# Patient Record
Sex: MALE | Race: WHITE | Employment: FULL TIME | URBAN - METROPOLITAN AREA
[De-identification: names, ages, dates, MRNs, and addresses within clinical notes are randomized per-mention and may not be internally consistent; named-entity substitution may affect disease eponyms.]

---

## 2022-09-04 ENCOUNTER — HOSPITAL ENCOUNTER (INPATIENT)
Age: 57
LOS: 2 days | Discharge: HOME | DRG: 200 | End: 2022-09-06
Attending: EMERGENCY MEDICINE | Admitting: SURGERY
Payer: COMMERCIAL

## 2022-09-04 ENCOUNTER — APPOINTMENT (OUTPATIENT)
Dept: GENERAL RADIOLOGY | Age: 57
DRG: 200 | End: 2022-09-04
Payer: COMMERCIAL

## 2022-09-04 ENCOUNTER — APPOINTMENT (OUTPATIENT)
Dept: CT IMAGING | Age: 57
DRG: 200 | End: 2022-09-04
Payer: COMMERCIAL

## 2022-09-04 DIAGNOSIS — S27.0XXA PNEUMOTHORAX, CLOSED, TRAUMATIC, INITIAL ENCOUNTER: Primary | ICD-10-CM
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
## 2022-09-04 DIAGNOSIS — S22.31XA CLOSED FRACTURE OF ONE RIB OF RIGHT SIDE, INITIAL ENCOUNTER: ICD-10-CM

## 2022-09-04 LAB
ABO/RH: NORMAL
ACETAMINOPHEN LEVEL: <5 MCG/ML (ref 10–30)
ALBUMIN SERPL-MCNC: 4.3 G/DL (ref 3.5–5.2)
ALP BLD-CCNC: 41 U/L (ref 40–129)
ALT SERPL-CCNC: 46 U/L (ref 0–40)
ANION GAP SERPL CALCULATED.3IONS-SCNC: 19 MMOL/L (ref 7–16)
ANTIBODY SCREEN: NORMAL
APTT: 25.2 SEC (ref 24.5–35.1)
AST SERPL-CCNC: 69 U/L (ref 0–39)
B.E.: -8.2 MMOL/L (ref -3–3)
BILIRUB SERPL-MCNC: <0.2 MG/DL (ref 0–1.2)
BUN BLDV-MCNC: 11 MG/DL (ref 6–20)
CALCIUM SERPL-MCNC: 8.6 MG/DL (ref 8.6–10.2)
CHLORIDE BLD-SCNC: 101 MMOL/L (ref 98–107)
CO2: 16 MMOL/L (ref 22–29)
COHB: 0.2 % (ref 0–1.5)
COMMENT: ABNORMAL
CREAT SERPL-MCNC: 1 MG/DL (ref 0.7–1.2)
CRITICAL: ABNORMAL
DATE ANALYZED: ABNORMAL
DATE OF COLLECTION: ABNORMAL
ETHANOL: 209 MG/DL (ref 0–0.08)
GFR AFRICAN AMERICAN: >60
GFR NON-AFRICAN AMERICAN: >60 ML/MIN/1.73
GLUCOSE BLD-MCNC: 116 MG/DL (ref 74–99)
HCO3: 17.4 MMOL/L (ref 22–26)
HCT VFR BLD CALC: 35.7 % (ref 37–54)
HEMOGLOBIN: 12.1 G/DL (ref 12.5–16.5)
HHB: 1.2 % (ref 0–5)
INR BLD: 0.9
LAB: ABNORMAL
LACTIC ACID: 2.4 MMOL/L (ref 0.5–2.2)
LACTIC ACID: 4 MMOL/L (ref 0.5–2.2)
Lab: ABNORMAL
MCH RBC QN AUTO: 32.4 PG (ref 26–35)
MCHC RBC AUTO-ENTMCNC: 33.9 % (ref 32–34.5)
MCV RBC AUTO: 95.5 FL (ref 80–99.9)
METHB: 0.3 % (ref 0–1.5)
MODE: ABNORMAL
O2 CONTENT: 18.4 ML/DL
O2 SATURATION: 98.8 % (ref 92–98.5)
O2HB: 98.3 % (ref 94–97)
OPERATOR ID: 1741
PATIENT TEMP: 37 C
PCO2: 36.5 MMHG (ref 35–45)
PDW BLD-RTO: 13 FL (ref 11.5–15)
PH BLOOD GAS: 7.3 (ref 7.35–7.45)
PLATELET # BLD: 211 E9/L (ref 130–450)
PMV BLD AUTO: 9.5 FL (ref 7–12)
PO2: 160.6 MMHG (ref 75–100)
POTASSIUM SERPL-SCNC: 3.76 MMOL/L (ref 3.5–5)
POTASSIUM SERPL-SCNC: 4 MMOL/L (ref 3.5–5)
PROTHROMBIN TIME: 9.8 SEC (ref 9.3–12.4)
RBC # BLD: 3.74 E12/L (ref 3.8–5.8)
SALICYLATE, SERUM: <0.3 MG/DL (ref 0–30)
SODIUM BLD-SCNC: 136 MMOL/L (ref 132–146)
SOURCE, BLOOD GAS: ABNORMAL
THB: 13.1 G/DL (ref 11.5–16.5)
TIME ANALYZED: 707
TOTAL CK: 1103 U/L (ref 20–200)
TOTAL PROTEIN: 7.1 G/DL (ref 6.4–8.3)
TRICYCLIC ANTIDEPRESSANTS SCREEN SERUM: NEGATIVE NG/ML
WBC # BLD: 10.3 E9/L (ref 4.5–11.5)

## 2022-09-04 PROCEDURE — 72170 X-RAY EXAM OF PELVIS: CPT

## 2022-09-04 PROCEDURE — 71045 X-RAY EXAM CHEST 1 VIEW: CPT

## 2022-09-04 PROCEDURE — 70450 CT HEAD/BRAIN W/O DYE: CPT

## 2022-09-04 PROCEDURE — 83605 ASSAY OF LACTIC ACID: CPT

## 2022-09-04 PROCEDURE — 85027 COMPLETE CBC AUTOMATED: CPT

## 2022-09-04 PROCEDURE — 6370000000 HC RX 637 (ALT 250 FOR IP): Performed by: STUDENT IN AN ORGANIZED HEALTH CARE EDUCATION/TRAINING PROGRAM

## 2022-09-04 PROCEDURE — 82077 ASSAY SPEC XCP UR&BREATH IA: CPT

## 2022-09-04 PROCEDURE — 6360000004 HC RX CONTRAST MEDICATION: Performed by: RADIOLOGY

## 2022-09-04 PROCEDURE — 36600 WITHDRAWAL OF ARTERIAL BLOOD: CPT | Performed by: SURGERY

## 2022-09-04 PROCEDURE — 6360000002 HC RX W HCPCS: Performed by: STUDENT IN AN ORGANIZED HEALTH CARE EDUCATION/TRAINING PROGRAM

## 2022-09-04 PROCEDURE — 80179 DRUG ASSAY SALICYLATE: CPT

## 2022-09-04 PROCEDURE — 99223 1ST HOSP IP/OBS HIGH 75: CPT | Performed by: SURGERY

## 2022-09-04 PROCEDURE — 2580000003 HC RX 258: Performed by: STUDENT IN AN ORGANIZED HEALTH CARE EDUCATION/TRAINING PROGRAM

## 2022-09-04 PROCEDURE — 86900 BLOOD TYPING SEROLOGIC ABO: CPT

## 2022-09-04 PROCEDURE — 85610 PROTHROMBIN TIME: CPT

## 2022-09-04 PROCEDURE — 72125 CT NECK SPINE W/O DYE: CPT

## 2022-09-04 PROCEDURE — 80143 DRUG ASSAY ACETAMINOPHEN: CPT

## 2022-09-04 PROCEDURE — 36415 COLL VENOUS BLD VENIPUNCTURE: CPT

## 2022-09-04 PROCEDURE — 74177 CT ABD & PELVIS W/CONTRAST: CPT

## 2022-09-04 PROCEDURE — 82805 BLOOD GASES W/O2 SATURATION: CPT

## 2022-09-04 PROCEDURE — 70486 CT MAXILLOFACIAL W/O DYE: CPT

## 2022-09-04 PROCEDURE — 84132 ASSAY OF SERUM POTASSIUM: CPT

## 2022-09-04 PROCEDURE — 86850 RBC ANTIBODY SCREEN: CPT

## 2022-09-04 PROCEDURE — 6810039001 HC L1 TRAUMA PRIORITY

## 2022-09-04 PROCEDURE — 72131 CT LUMBAR SPINE W/O DYE: CPT

## 2022-09-04 PROCEDURE — 72128 CT CHEST SPINE W/O DYE: CPT

## 2022-09-04 PROCEDURE — 32551 INSERTION OF CHEST TUBE: CPT | Performed by: SURGERY

## 2022-09-04 PROCEDURE — 36410 VNPNXR 3YR/> PHY/QHP DX/THER: CPT | Performed by: SURGERY

## 2022-09-04 PROCEDURE — 82550 ASSAY OF CK (CPK): CPT

## 2022-09-04 PROCEDURE — 71260 CT THORAX DX C+: CPT

## 2022-09-04 PROCEDURE — 80053 COMPREHEN METABOLIC PANEL: CPT

## 2022-09-04 PROCEDURE — 2580000003 HC RX 258: Performed by: RADIOLOGY

## 2022-09-04 PROCEDURE — 86901 BLOOD TYPING SEROLOGIC RH(D): CPT

## 2022-09-04 PROCEDURE — 0W9930Z DRAINAGE OF RIGHT PLEURAL CAVITY WITH DRAINAGE DEVICE, PERCUTANEOUS APPROACH: ICD-10-PCS | Performed by: STUDENT IN AN ORGANIZED HEALTH CARE EDUCATION/TRAINING PROGRAM

## 2022-09-04 PROCEDURE — 2060000000 HC ICU INTERMEDIATE R&B

## 2022-09-04 PROCEDURE — 80307 DRUG TEST PRSMV CHEM ANLYZR: CPT

## 2022-09-04 PROCEDURE — 32551 INSERTION OF CHEST TUBE: CPT

## 2022-09-04 PROCEDURE — 2500000003 HC RX 250 WO HCPCS: Performed by: STUDENT IN AN ORGANIZED HEALTH CARE EDUCATION/TRAINING PROGRAM

## 2022-09-04 PROCEDURE — 85730 THROMBOPLASTIN TIME PARTIAL: CPT

## 2022-09-04 PROCEDURE — 99285 EMERGENCY DEPT VISIT HI MDM: CPT

## 2022-09-04 RX ORDER — SODIUM CHLORIDE 9 MG/ML
25 INJECTION, SOLUTION INTRAVENOUS PRN
Status: DISCONTINUED | OUTPATIENT
Start: 2022-09-04 | End: 2022-09-06 | Stop reason: HOSPADM

## 2022-09-04 RX ORDER — LIDOCAINE HYDROCHLORIDE AND EPINEPHRINE 10; 10 MG/ML; UG/ML
20 INJECTION, SOLUTION INFILTRATION; PERINEURAL ONCE
Status: DISCONTINUED | OUTPATIENT
Start: 2022-09-04 | End: 2022-09-06 | Stop reason: HOSPADM

## 2022-09-04 RX ORDER — POLYETHYLENE GLYCOL 3350 17 G/17G
17 POWDER, FOR SOLUTION ORAL DAILY
Status: DISCONTINUED | OUTPATIENT
Start: 2022-09-04 | End: 2022-09-06 | Stop reason: HOSPADM

## 2022-09-04 RX ORDER — SODIUM CHLORIDE 0.9 % (FLUSH) 0.9 %
5-40 SYRINGE (ML) INJECTION EVERY 12 HOURS SCHEDULED
Status: DISCONTINUED | OUTPATIENT
Start: 2022-09-04 | End: 2022-09-06 | Stop reason: HOSPADM

## 2022-09-04 RX ORDER — ONDANSETRON 2 MG/ML
4 INJECTION INTRAMUSCULAR; INTRAVENOUS EVERY 6 HOURS PRN
Status: DISCONTINUED | OUTPATIENT
Start: 2022-09-04 | End: 2022-09-06 | Stop reason: HOSPADM

## 2022-09-04 RX ORDER — KETAMINE HCL IN NACL, ISO-OSM 100MG/10ML
20 SYRINGE (ML) INJECTION ONCE
Status: COMPLETED | OUTPATIENT
Start: 2022-09-04 | End: 2022-09-04

## 2022-09-04 RX ORDER — ACETAMINOPHEN 500 MG
1000 TABLET ORAL EVERY 8 HOURS SCHEDULED
Status: DISCONTINUED | OUTPATIENT
Start: 2022-09-04 | End: 2022-09-06 | Stop reason: HOSPADM

## 2022-09-04 RX ORDER — ENOXAPARIN SODIUM 100 MG/ML
30 INJECTION SUBCUTANEOUS 2 TIMES DAILY
Status: DISCONTINUED | OUTPATIENT
Start: 2022-09-04 | End: 2022-09-06 | Stop reason: HOSPADM

## 2022-09-04 RX ORDER — SODIUM CHLORIDE 9 MG/ML
1000 INJECTION, SOLUTION INTRAVENOUS ONCE
Status: COMPLETED | OUTPATIENT
Start: 2022-09-04 | End: 2022-09-04

## 2022-09-04 RX ORDER — SODIUM CHLORIDE 0.9 % (FLUSH) 0.9 %
5-40 SYRINGE (ML) INJECTION PRN
Status: DISCONTINUED | OUTPATIENT
Start: 2022-09-04 | End: 2022-09-06 | Stop reason: HOSPADM

## 2022-09-04 RX ORDER — METHOCARBAMOL 500 MG/1
1500 TABLET, FILM COATED ORAL 4 TIMES DAILY
Status: DISCONTINUED | OUTPATIENT
Start: 2022-09-04 | End: 2022-09-06 | Stop reason: HOSPADM

## 2022-09-04 RX ORDER — OXYCODONE HYDROCHLORIDE 5 MG/1
5 TABLET ORAL EVERY 4 HOURS PRN
Status: DISCONTINUED | OUTPATIENT
Start: 2022-09-04 | End: 2022-09-06 | Stop reason: HOSPADM

## 2022-09-04 RX ORDER — SENNA AND DOCUSATE SODIUM 50; 8.6 MG/1; MG/1
1 TABLET, FILM COATED ORAL 2 TIMES DAILY
Status: DISCONTINUED | OUTPATIENT
Start: 2022-09-04 | End: 2022-09-06 | Stop reason: HOSPADM

## 2022-09-04 RX ORDER — OXYCODONE HYDROCHLORIDE 10 MG/1
10 TABLET ORAL EVERY 4 HOURS PRN
Status: DISCONTINUED | OUTPATIENT
Start: 2022-09-04 | End: 2022-09-06 | Stop reason: HOSPADM

## 2022-09-04 RX ORDER — LIDOCAINE 4 G/G
1 PATCH TOPICAL DAILY
Status: DISCONTINUED | OUTPATIENT
Start: 2022-09-04 | End: 2022-09-06 | Stop reason: HOSPADM

## 2022-09-04 RX ORDER — SODIUM CHLORIDE 0.9 % (FLUSH) 0.9 %
10 SYRINGE (ML) INJECTION ONCE
Status: COMPLETED | OUTPATIENT
Start: 2022-09-04 | End: 2022-09-04

## 2022-09-04 RX ORDER — ONDANSETRON 4 MG/1
4 TABLET, ORALLY DISINTEGRATING ORAL EVERY 8 HOURS PRN
Status: DISCONTINUED | OUTPATIENT
Start: 2022-09-04 | End: 2022-09-06 | Stop reason: HOSPADM

## 2022-09-04 RX ADMIN — METHOCARBAMOL 1500 MG: 500 TABLET ORAL at 22:05

## 2022-09-04 RX ADMIN — SENNOSIDES AND DOCUSATE SODIUM 1 TABLET: 8.6; 5 TABLET ORAL at 16:09

## 2022-09-04 RX ADMIN — OXYCODONE 5 MG: 5 TABLET ORAL at 10:55

## 2022-09-04 RX ADMIN — Medication 10 ML: at 07:20

## 2022-09-04 RX ADMIN — ENOXAPARIN SODIUM 30 MG: 100 INJECTION SUBCUTANEOUS at 22:07

## 2022-09-04 RX ADMIN — METHOCARBAMOL 1500 MG: 500 TABLET ORAL at 16:09

## 2022-09-04 RX ADMIN — ACETAMINOPHEN 1000 MG: 500 TABLET ORAL at 22:05

## 2022-09-04 RX ADMIN — Medication 20 MG: at 08:19

## 2022-09-04 RX ADMIN — Medication 10 ML: at 22:08

## 2022-09-04 RX ADMIN — IOPAMIDOL 90 ML: 755 INJECTION, SOLUTION INTRAVENOUS at 07:43

## 2022-09-04 RX ADMIN — ACETAMINOPHEN 1000 MG: 500 TABLET ORAL at 16:09

## 2022-09-04 RX ADMIN — SODIUM CHLORIDE 1000 ML: 9 INJECTION, SOLUTION INTRAVENOUS at 09:51

## 2022-09-04 ASSESSMENT — PAIN DESCRIPTION - DESCRIPTORS: DESCRIPTORS: ACHING

## 2022-09-04 ASSESSMENT — ENCOUNTER SYMPTOMS
DIARRHEA: 0
ABDOMINAL PAIN: 0
RHINORRHEA: 0
COUGH: 0
SHORTNESS OF BREATH: 0
VOMITING: 0
NAUSEA: 0

## 2022-09-04 ASSESSMENT — PAIN DESCRIPTION - ORIENTATION: ORIENTATION: RIGHT

## 2022-09-04 ASSESSMENT — PAIN DESCRIPTION - LOCATION
LOCATION: SHOULDER
LOCATION: RIB CAGE

## 2022-09-04 ASSESSMENT — PAIN SCALES - GENERAL
PAINLEVEL_OUTOF10: 6
PAINLEVEL_OUTOF10: 7
PAINLEVEL_OUTOF10: 6
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
## 2022-09-04 NOTE — PROCEDURES
Lizbeth Lubin is a 61 y.o. male patient. No diagnosis found. No past medical history on file. Blood pressure 112/75, pulse 82, temperature 97.6 °F (36.4 °C), resp. rate 18, height 5' 11\" (1.803 m), weight 185 lb (83.9 kg), SpO2 100 %. Chest Tube Insertion    Date/Time: 9/4/2022 8:49 AM  Performed by: Cristal Jo DO  Authorized by: Cristal Jo DO     Consent:     Consent obtained:  Emergent situation    Consent given by:  Patient    Risks, benefits, and alternatives were discussed: yes      Risks discussed:  Bleeding, incomplete drainage, damage to surrounding structures and pain    Alternatives discussed:  No treatment, delayed treatment, alternative treatment and observation  Universal protocol:     Procedure explained and questions answered to patient or proxy's satisfaction: yes      Test results available: yes      Imaging studies available: yes      Patient identity confirmed:  Verbally with patient and arm band  Pre-procedure details:     Skin preparation:  Povidone-iodine    Preparation: Patient was prepped and draped in the usual sterile fashion    Anesthesia:     Anesthesia method:  Local infiltration    Local anesthetic:  Lidocaine 1% WITH epi  Procedure details:     Placement location:  R lateral    Scalpel size:  11    Tube size (Fr):  28    Dissection instrument:  Finger and Dottie clamp    Tube connected to:  Suction    Suture material:  0 silk    Dressing:  4x4 sterile gauze  Post-procedure details:     Procedure completion:  Tolerated  .   Dr. Tessy Conner was present for the procedure    Cristal Jo DO  9/4/2022
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
## 2022-09-04 NOTE — ED NOTES
Fell backwards down 14-15 wooden stairs onto a concrete floor.      Saturnino Fowler RN  09/04/22 9624

## 2022-09-04 NOTE — ED NOTES
Abrasion noted to the right side of the back.  Mild thoracic spine tenderness     Sushil Guerra RN  09/04/22 0750

## 2022-09-04 NOTE — PROGRESS NOTES
Trauma Tertiary Survey    Admit Date: 9/4/2022  Hospital day 0    CC:  fall from stairs     Alcohol pre-screening:  Men: How many times in the past year have you had 5 or more drinks in a day?  1 or more  How much do you drink on a daily basis? Drinks 1 bottle of wine on the weekends     Scheduled Meds:   sodium chloride flush  5-40 mL IntraVENous 2 times per day    acetaminophen  1,000 mg Oral 3 times per day    polyethylene glycol  17 g Oral Daily    sennosides-docusate sodium  1 tablet Oral BID    enoxaparin  30 mg SubCUTAneous BID    methocarbamol  1,500 mg Oral 4x Daily    lidocaine  1 patch TransDERmal Daily    lidocaine-EPINEPHrine  20 mL IntraDERmal Once     Continuous Infusions:   sodium chloride       PRN Meds:sodium chloride flush, sodium chloride, oxyCODONE **OR** oxyCODONE, HYDROmorphone, ondansetron **OR** ondansetron    Subjective:     No new issues reports pain around chest tube site. C-collar cleared. Objective:   Patient Vitals for the past 8 hrs:   BP Pulse Resp SpO2   09/04/22 1715 -- 92 15 99 %   09/04/22 1700 (!) 137/92 85 16 99 %   09/04/22 1645 -- 90 14 99 %   09/04/22 1630 -- 81 17 99 %   09/04/22 1615 -- 83 17 99 %   09/04/22 1600 132/85 83 15 99 %   09/04/22 1530 -- 90 18 98 %   09/04/22 1500 136/87 85 29 100 %   09/04/22 1445 -- 85 13 100 %   09/04/22 1430 -- 83 15 100 %   09/04/22 1400 131/86 85 13 100 %   09/04/22 1330 -- 89 16 100 %   09/04/22 1300 133/79 91 16 99 %   09/04/22 1230 -- 90 22 100 %   09/04/22 1200 107/84 88 18 99 %   09/04/22 1130 -- 92 22 100 %   09/04/22 1100 (!) 128/91 85 16 100 %   09/04/22 1030 -- 89 16 100 %       No intake/output data recorded. No intake/output data recorded. No past medical history on file. @[facility]@    Radiology:  XR CHEST PORTABLE   Final Result   1. Right chest tube in place with right chest wall soft tissue emphysema and   without findings of right pneumothorax. 2.  The lungs are essentially clear.          CT FACIAL BONES WO CONTRAST   Final Result   No acute facial bone trauma. CT HEAD WO CONTRAST   Final Result   No acute intracranial abnormality. CT CERVICAL SPINE WO CONTRAST   Final Result   No acute abnormality of the cervical spine. CT CHEST W CONTRAST   Final Result   Moderate size right pneumothorax with fracture seen of the right   anterolateral 3rd rib with associated  pleural thickening. Remainder of the   chest is grossly unremarkable. Findings were discussed with Francois Kelly at time of interpretation of the   examination. CT ABDOMEN PELVIS W IV CONTRAST Additional Contrast? None   Final Result   No CT evidence of acute intra-abdominopelvic abnormality. Moderate size   right pneumothorax with atelectatic changes seen at the right lung base. CT LUMBAR SPINE WO CONTRAST   Final Result   Multilevel degenerative changes seen within the lumbar spine with no evidence   of fracture or dislocation. CT THORACIC SPINE WO CONTRAST   Final Result   No acute fracture or malalignment. XR CHEST 1 VIEW   Final Result   1. Acute fracture of the axillary right 3rd rib and small right pneumothorax. 2.  No focal pulmonary infiltrate or pulmonary contusion identified. XR PELVIS (1-2 VIEWS)   Final Result   No fracture or dislocation. No acute disease identified.          XR CHEST PORTABLE    (Results Pending)       PHYSICAL EXAM:     Central Nervous System  Loss of consciousness:  No    GCS:    Eye:  4 - Opens eyes on own  Motor:  6 - Follows simple motor commands  Verbal:  5 - Alert and oriented    Neuromuscular blockade: No  Pupil size:  Left 4 mm    Right 4 mm  Pupil reaction: Yes    Wiggles fingers: Left Yes Right Yes  Wiggles toes: Left Yes   Right Yes    Hand grasp:   Left  Present      Right  Present  Plantar flexion: Left  Present      Right   Present    PHYSICAL EXAM  General: No apparent distress, comfortable   HEENT: Trachea midline, no masses, Pupils equal round. Mild R periorbital ecchymosis    Chest: Respiratory effort was normal with no retractions or use of accessory muscles. Cardiovascular: Extremities warm, well perfused,   Abdomen:  Soft and non distended. No tenderness, guarding, rebound, or rigidity   Extremities: Moves all 4 extremeties, No pedal edema. Spine:   Spine Tenderness ROM   Cervical 0/10 Normal   Thoracic 0 /10 Normal   Lumbar 0 /10 Normal     Musculoskeletal:    Joint Tenderness Swelling ROM   Right shoulder Absent absent normal   Left shoulder absent absent normal   Right elbow absent absent normal   Left elbow absent absent normal   Right wrist absent Present normal   Left wrist absent absent normal   Right hand grasp Absent Present  normal   Left hand grasp absent absent normal   Right hip absent absent normal   Left hip absent absent normal   Right knee Absent absent normal   Left knee absent absent normal   Right ankle absent absent normal   Left ankle absent absent normal   Right foot Absent absent normal   Left foot absent absent normal       CONSULTS:     PROCEDURES: chest tube     INJURIES:      Principal Problem:    Pneumothorax, closed, traumatic, initial encounter  Resolved Problems:    * No resolved hospital problems. *        Assessment/Plan:       Neuro:  GCS 15, no acute issues. Acute pain - multimodal pain control. Possible maxillary fx   CV: HR near normal limits, no acute issues   Pulm:  Right rib 3-4 fx with PTX s/p chest tube 9/4 - continue to suction. AM CXR.  Pulmonary hygiene    GI: ok for general diet   Renal: no acute issues   ID: afebrile, no acute issues     Endocrine: no acute issues,  glucose < 180  MSK: Right hand edema - no tenderness, full ROM; x-ray if start endorsing pain    Heme: no acute issues      Bowel regime: glycolax, senna   Pain control/Sedation: oxy, dilaudid PRN, tylenol, lidocaine patch robaxin   DVT prophylaxis: Lovenox     GI: diet   Glucose protocol: none  Mouth/Eye care: per patient, Chinedu Mei: none     Code status:    Full Code    Patient/Family update:  As available     Disposition:  tele admit        Electronically signed by Leslye Seip, MD on 9/4/22 at 6:23 PM EDT

## 2022-09-04 NOTE — ED NOTES
Attempted to give report at this time. RN unable to take report until after 1930.       Marycruz Armijo RN  09/04/22 [id number]

## 2022-09-04 NOTE — ED PROVIDER NOTES
Patient is a 58-year-old male presents via EMS as a trauma. Patient was reportedly found at the bottom of the steps, he called 911. Patient states he was attempting to lock his door and fell down 14-15 steps last night. Patient is potentially been on the ground for 67 hours prior to waking up. Patient is unknown loss of consciousness, states he \"might have\" lost consciousness. Patient with no complaints. Patient states that he does drink a bottle of wine a day, denies other medical history. Patient denies any difficulty breathing, chest pain, abdominal pain, urinary or GI symptoms. Patient was reviewed by the trauma team on initial evaluation. Review of Systems   Constitutional:  Negative for chills and fever. HENT:  Negative for congestion and rhinorrhea. Eyes:  Negative for visual disturbance. Respiratory:  Negative for cough and shortness of breath. Cardiovascular:  Negative for chest pain. Gastrointestinal:  Negative for abdominal pain, diarrhea, nausea and vomiting. Endocrine: Negative for polyuria. Genitourinary:  Negative for dysuria. Musculoskeletal:  Negative for arthralgias and myalgias. Skin:  Negative for wound. Allergic/Immunologic: Negative for immunocompromised state. Neurological:  Negative for dizziness, syncope, weakness, light-headedness, numbness and headaches. Psychiatric/Behavioral:  Negative for confusion. The patient is not nervous/anxious. Physical Exam  Vitals and nursing note reviewed. Constitutional:       General: He is not in acute distress. Appearance: He is not ill-appearing. Interventions: Cervical collar in place. HENT:      Head: Normocephalic and atraumatic. Mouth/Throat:      Mouth: Mucous membranes are moist.   Eyes:      Pupils: Pupils are equal, round, and reactive to light. Cardiovascular:      Rate and Rhythm: Normal rate. Pulses: Normal pulses.    Pulmonary:      Effort: Pulmonary effort is normal. Abdominal:      General: Abdomen is flat. Tenderness: There is no abdominal tenderness. Musculoskeletal:         General: Normal range of motion. Skin:     General: Skin is warm and dry. Capillary Refill: Capillary refill takes less than 2 seconds. Neurological:      General: No focal deficit present. Mental Status: He is alert and oriented to person, place, and time. MDM  Number of Diagnoses or Management Options  Pneumothorax, closed, traumatic, initial encounter  Diagnosis management comments: Patient is a 39-year-old male presenting as a trauma alert after a fall down multiple steps last night. Patient patient presented via EMS awake, alert, following all commands, amnestic to the events. Patient GCS was 15. Patient with no neurodeficits. Patient was seen by the trauma team on initial evaluation, care and further evaluations and disposition per trauma team.  No acute interventions required by the emergency department staff during trauma assessment, care. Patient was monitored in the emergency department until bed available without incident. Patient cared for by trauma services.          --------------------------------------------- PAST HISTORY ---------------------------------------------  Past Medical History:  has no past medical history on file. Past Surgical History:  has no past surgical history on file. Social History:      Family History: family history is not on file. The patients home medications have been reviewed.     Allergies: Patient has no allergy information on record.    -------------------------------------------------- RESULTS -------------------------------------------------    LABS:  Results for orders placed or performed during the hospital encounter of 09/04/22   Blood Gas, Arterial   Result Value Ref Range    Date Analyzed [id number]     Time Analyzed 0707     Source: Blood Arterial     pH, Blood Gas 7.297 (L) 7.350 - 7.450    PCO2 36.5 35.0 - 45.0 mmHg    PO2 160.6 (H) 75.0 - 100.0 mmHg    HCO3 17.4 (L) 22.0 - 26.0 mmol/L    B.E. -8.2 (L) -3.0 - 3.0 mmol/L    O2 Sat 98.8 (H) 92.0 - 98.5 %    O2Hb 98.3 (H) 94.0 - 97.0 %    COHb 0.2 0.0 - 1.5 %    MetHb 0.3 0.0 - 1.5 %    O2 Content 18.4 mL/dL    HHb 1.2 0.0 - 5.0 %    tHb (est) 13.1 11.5 - 16.5 g/dL    Potassium 3.76 3.50 - 5.00 mmol/L    Mode NRB 15L     Comment trauma team     Date Of Collection      Time Collected      Pt Temp 37.0 C     ID [id number]     Lab [id number]     Critical(s) Notified .  No Critical Values    Comprehensive Metabolic Panel   Result Value Ref Range    Sodium 136 132 - 146 mmol/L    Potassium 4.0 3.5 - 5.0 mmol/L    Chloride 101 98 - 107 mmol/L    CO2 16 (L) 22 - 29 mmol/L    Anion Gap 19 (H) 7 - 16 mmol/L    Glucose 116 (H) 74 - 99 mg/dL    BUN 11 6 - 20 mg/dL    Creatinine 1.0 0.7 - 1.2 mg/dL    GFR Non-African American >60 >=60 mL/min/1.73    GFR African American >60     Calcium 8.6 8.6 - 10.2 mg/dL    Total Protein 7.1 6.4 - 8.3 g/dL    Albumin 4.3 3.5 - 5.2 g/dL    Total Bilirubin <0.2 0.0 - 1.2 mg/dL    Alkaline Phosphatase 41 40 - 129 U/L    ALT 46 (H) 0 - 40 U/L    AST 69 (H) 0 - 39 U/L   CBC   Result Value Ref Range    WBC 10.3 4.5 - 11.5 E9/L    RBC 3.74 (L) 3.80 - 5.80 E12/L    Hemoglobin 12.1 (L) 12.5 - 16.5 g/dL    Hematocrit 35.7 (L) 37.0 - 54.0 %    MCV 95.5 80.0 - 99.9 fL    MCH 32.4 26.0 - 35.0 pg    MCHC 33.9 32.0 - 34.5 %    RDW 13.0 11.5 - 15.0 fL    Platelets 162 464 - 446 E9/L    MPV 9.5 7.0 - 12.0 fL   Protime-INR   Result Value Ref Range    Protime 9.8 9.3 - 12.4 sec    INR 0.9    APTT   Result Value Ref Range    aPTT 25.2 24.5 - 35.1 sec   Lactic Acid   Result Value Ref Range    Lactic Acid 4.0 (HH) 0.5 - 2.2 mmol/L   Serum Drug Screen   Result Value Ref Range    Ethanol Lvl 209 mg/dL    Acetaminophen Level <5.0 (L) 10.0 - 44.7 mcg/mL    Salicylate, Serum <1.4 0.0 - 30.0 mg/dL    TCA Scrn NEGATIVE Cutoff:300 ng/mL   CK   Result Value Ref Range    Total CK 1,103 (H) 20 - 200 U/L   TYPE AND SCREEN   Result Value Ref Range    ABO/Rh O POS     Antibody Screen NEG        RADIOLOGY:  XR CHEST PORTABLE   Final Result   1. Right chest tube in place with right chest wall soft tissue emphysema and   without findings of right pneumothorax. 2.  The lungs are essentially clear. CT FACIAL BONES WO CONTRAST   Final Result   No acute facial bone trauma. CT HEAD WO CONTRAST   Final Result   No acute intracranial abnormality. CT CERVICAL SPINE WO CONTRAST   Final Result   No acute abnormality of the cervical spine. CT CHEST W CONTRAST   Final Result   Moderate size right pneumothorax with fracture seen of the right   anterolateral 3rd rib with associated  pleural thickening. Remainder of the   chest is grossly unremarkable. Findings were discussed with Yessenia Garrett at time of interpretation of the   examination. CT ABDOMEN PELVIS W IV CONTRAST Additional Contrast? None   Final Result   No CT evidence of acute intra-abdominopelvic abnormality. Moderate size   right pneumothorax with atelectatic changes seen at the right lung base. CT LUMBAR SPINE WO CONTRAST   Final Result   Multilevel degenerative changes seen within the lumbar spine with no evidence   of fracture or dislocation. CT THORACIC SPINE WO CONTRAST   Final Result   No acute fracture or malalignment. XR CHEST 1 VIEW   Final Result   1. Acute fracture of the axillary right 3rd rib and small right pneumothorax. 2.  No focal pulmonary infiltrate or pulmonary contusion identified. XR PELVIS (1-2 VIEWS)   Final Result   No fracture or dislocation. No acute disease identified.           ------------------------- NURSING NOTES AND VITALS REVIEWED ---------------------------  Date / Time Roomed:  9/4/2022  6:51 AM  ED Bed Assignment:  08/08    The nursing notes within the ED encounter and vital signs as below have been reviewed.      Patient Vitals for the past 24 hrs:   BP Temp Pulse Resp SpO2 Height Weight   09/04/22 1030 -- -- 89 16 100 % -- --   09/04/22 1015 -- -- 90 15 100 % -- --   09/04/22 1000 128/88 -- 87 17 100 % -- --   09/04/22 0945 (!) 119/93 -- 94 23 99 % -- --   09/04/22 0930 -- -- 89 19 100 % -- --   09/04/22 0915 -- -- 87 16 100 % -- --   09/04/22 0900 -- -- 88 14 99 % -- --   09/04/22 0845 -- -- 96 17 99 % -- --   09/04/22 0830 -- -- 95 17 99 % -- --   09/04/22 0815 136/89 -- 86 16 99 % -- --   09/04/22 0711 112/75 97.6 °F (36.4 °C) 82 18 100 % -- --   09/04/22 0709 104/83 -- 85 18 100 % -- --   09/04/22 0705 -- -- -- -- -- 5' 11\" (1.803 m) 185 lb (83.9 kg)   09/04/22 0705 (!) 92/40 -- 88 18 98 % -- --   09/04/22 0653 -- -- 87 -- -- -- --       Oxygen Saturation Interpretation: Normal    ------------------------------------------ PROGRESS NOTES ------------------------------------------  Re-evaluation(s):  Patients symptoms show no change  Repeat physical examination is not changed    Counseling:  I have spoken with the patient and discussed todays results, in addition to providing specific details for the plan of care and counseling regarding the diagnosis and prognosis. Their questions are answered at this time and they are agreeable with the plan of admission.    --------------------------------- ADDITIONAL PROVIDER NOTES ---------------------------------  Consultations:  Trauma services. They will admit the patient. This patient's ED course included: a personal history and physicial examination and re-evaluation prior to disposition    This patient has remained hemodynamically stable during their ED course. Diagnosis:  1. Pneumothorax, closed, traumatic, initial encounter      Disposition:  Patient's disposition: Admit to telemetry  Patient's condition is stable. 9/4/22, 10:38 AM EDT.     This note is prepared by Faizan Lewis MD -PGY- 3               Faizan Lewis MD  Resident  09/04/22 7830

## 2022-09-04 NOTE — PROGRESS NOTES
Cervical Spine C Collar Clearance -  Patient CT Spine Imaging normal.  Patient does not complain of Cervical Spine tenderness upon palpation. Patients C-Spine ranged. C-spine clear, no need for C-Collar.     Electronically signed by Viral Turner MD on 9/4/2022 at 3:29 PM

## 2022-09-04 NOTE — H&P
TRAUMA HISTORY & PHYSICAL  Surgical Resident/Advance Practice Nurse  9/4/2022  7:19 AM    PRIMARY SURVEY    CHIEF COMPLAINT:  Trauma team.    Injury occurred several hours prior to arrival. The patient was unlocking a door, when he lost his balance and fell backwards down 10-15 steps. EtOH+. Reportedly pale, diaphoretic, and lethargic en route. Hypotensive prior to arrival with improvement of BP with IVF. GCS 15 on arrival    AIRWAY:   Airway Normal  EMS ETT Absent  Noisy respirations Absent  Retractions: Absent  Vomiting/bleeding: Absent      BREATHING:    Midaxillary breath sound left:  Normal  Midaxillary breath sound right:  Normal    Cough sound intensity:  good   FiO2: 15 liters/min via non-rebreather face mask   SMI 2500 mL. CIRCULATION:   Femerol pulse intensity: Strong  Palpebral conjunctiva: Pink     Vitals:    09/04/22 0711   BP: 112/75   Pulse: 82   Resp: 18   Temp: 97.6 °F (36.4 °C)   SpO2: 100%          FAST EXAM:  Not performed    Central Nervous System    GCS Initial 15 minutes   Eye  Motor  Verbal 4 - Opens eyes on own  6 - Follows simple motor commands  5 - Alert and oriented 4 - Opens eyes on own  6 - Follows simple motor commands  5 - Alert and oriented     Neuromuscular blockade: No  Pupil size:  Left 6 mm    Right 6 mm  Pupil reaction: Yes    Wiggles fingers: Left Yes Right Yes  Wiggles toes: Left Yes   Right Yes    Hand grasp:   Left  Present      Right  Present  Plantar flexion: Left  Present      Right   Present    Loss of consciousness:  unsure  History Obtained From:  Patient & EMS  Private Medical Doctor: Kodak Cisneros MD     Pre-exisiting Medical History:  no      Conditions: No past medical history on file. Medications:   Prior to Admission medications    Not on File   Pseudaphen, Ibuprofen       Allergies: NKDA      Social History:   Drinks one bottle of wine daily.  Denies tobacco or other recreational substance use      Past Surgical History:    No  No past surgical history on file. Anticoagulant use: no  Antiplatelet use:  no  NSAID use in last 72 hours: yes  Taken PCN in past:  unknown  Last food/drink: 9/3  Last tetanus: unsure    Family History:   Not pertinent to presenting problem. No prior adverse reactions to anesthesia    Complaints:   Head:  mild  Neck:   None  Chest:   None  Back:   moderate  Abdomen:   None  Extremities:   None      Review of systems:  All negative unless otherwise noted. SECONDARY SURVEY  Head/scalp: Atraumatic    Face: right periorbital ecchymosis    Eyes/ears/nose: Atraumatic    Pharynx/mouth: Atraumatic    Neck: Atraumatic     Cervical spine tenderness:   Cervical collar in place at time of arrival  Pain:  none  ROM:  not indicated    Chest wall:  right scapula tenderness    Heart:  Regular rate & rhythm    Abdomen: right flank abrasions. Soft ND  Tenderness:  none    Pelvis: Atraumatic  Tenderness: none    Thoracolumbar spine: Atraumatic  Tenderness:  mild    Genitourinary:  Atraumatic. No blood or urine noted    Rectum: Atraumatic. No blood noted. Perineum: Atraumatic. No blood or urine noted. Extremities:   Sensory normal  Motor normal    Distal Pulses  Left arm normal  Right arm normal  Left leg normal  Right leg normal    Capillary refill  Left arm normal  Right arm normal  Left leg normal  Right leg normal    Procedures in ED: femoral ABG, femoral venipuncture    In the event of Emergency Blood Transfusion:  Due to the critical condition of this patient, I request the immediate release of blood products for emergency transfusion secondary to shock. I understand the increased risks incurred by the lack of complete transfusion testing. Radiology: CT head, chest, abdomen/pelvis, face, c/t/l spine.  XR chest & pelvis     Consultations: TBD    Admission/Diagnosis: Fall down stairs    Plan of Treatment:  - follow up scans  - follow up labs  - IVF  - pain control prn  - maintain cervical collar  - tertiary exam Plan discussed with Dr. Hayley Trimble     Electronically signed by Nikos Ash DO on 9/4/2022 at 7:19 AM

## 2022-09-05 ENCOUNTER — APPOINTMENT (OUTPATIENT)
Dept: GENERAL RADIOLOGY | Age: 57
DRG: 200 | End: 2022-09-05
Payer: COMMERCIAL

## 2022-09-05 LAB
ANION GAP SERPL CALCULATED.3IONS-SCNC: 12 MMOL/L (ref 7–16)
BASOPHILS ABSOLUTE: 0.01 E9/L (ref 0–0.2)
BASOPHILS RELATIVE PERCENT: 0.2 % (ref 0–2)
BUN BLDV-MCNC: 10 MG/DL (ref 6–20)
CALCIUM SERPL-MCNC: 8.7 MG/DL (ref 8.6–10.2)
CHLORIDE BLD-SCNC: 101 MMOL/L (ref 98–107)
CO2: 23 MMOL/L (ref 22–29)
CREAT SERPL-MCNC: 0.7 MG/DL (ref 0.7–1.2)
EOSINOPHILS ABSOLUTE: 0.01 E9/L (ref 0.05–0.5)
EOSINOPHILS RELATIVE PERCENT: 0.2 % (ref 0–6)
GFR AFRICAN AMERICAN: >60
GFR NON-AFRICAN AMERICAN: >60 ML/MIN/1.73
GLUCOSE BLD-MCNC: 111 MG/DL (ref 74–99)
HCT VFR BLD CALC: 35.1 % (ref 37–54)
HEMOGLOBIN: 12.2 G/DL (ref 12.5–16.5)
IMMATURE GRANULOCYTES #: 0.02 E9/L
IMMATURE GRANULOCYTES %: 0.3 % (ref 0–5)
LYMPHOCYTES ABSOLUTE: 1.07 E9/L (ref 1.5–4)
LYMPHOCYTES RELATIVE PERCENT: 18.1 % (ref 20–42)
MCH RBC QN AUTO: 34 PG (ref 26–35)
MCHC RBC AUTO-ENTMCNC: 34.8 % (ref 32–34.5)
MCV RBC AUTO: 97.8 FL (ref 80–99.9)
MONOCYTES ABSOLUTE: 0.58 E9/L (ref 0.1–0.95)
MONOCYTES RELATIVE PERCENT: 9.8 % (ref 2–12)
NEUTROPHILS ABSOLUTE: 4.21 E9/L (ref 1.8–7.3)
NEUTROPHILS RELATIVE PERCENT: 71.4 % (ref 43–80)
PDW BLD-RTO: 12.9 FL (ref 11.5–15)
PLATELET # BLD: 167 E9/L (ref 130–450)
PMV BLD AUTO: 9.8 FL (ref 7–12)
POTASSIUM REFLEX MAGNESIUM: 4.2 MMOL/L (ref 3.5–5)
RBC # BLD: 3.59 E12/L (ref 3.8–5.8)
SODIUM BLD-SCNC: 136 MMOL/L (ref 132–146)
WBC # BLD: 5.9 E9/L (ref 4.5–11.5)

## 2022-09-05 PROCEDURE — 6370000000 HC RX 637 (ALT 250 FOR IP): Performed by: STUDENT IN AN ORGANIZED HEALTH CARE EDUCATION/TRAINING PROGRAM

## 2022-09-05 PROCEDURE — 71045 X-RAY EXAM CHEST 1 VIEW: CPT

## 2022-09-05 PROCEDURE — 36415 COLL VENOUS BLD VENIPUNCTURE: CPT

## 2022-09-05 PROCEDURE — 2060000000 HC ICU INTERMEDIATE R&B

## 2022-09-05 PROCEDURE — 94640 AIRWAY INHALATION TREATMENT: CPT

## 2022-09-05 PROCEDURE — 80048 BASIC METABOLIC PNL TOTAL CA: CPT

## 2022-09-05 PROCEDURE — 85025 COMPLETE CBC W/AUTO DIFF WBC: CPT

## 2022-09-05 PROCEDURE — 2580000003 HC RX 258: Performed by: STUDENT IN AN ORGANIZED HEALTH CARE EDUCATION/TRAINING PROGRAM

## 2022-09-05 PROCEDURE — 2700000000 HC OXYGEN THERAPY PER DAY

## 2022-09-05 PROCEDURE — 6360000002 HC RX W HCPCS: Performed by: STUDENT IN AN ORGANIZED HEALTH CARE EDUCATION/TRAINING PROGRAM

## 2022-09-05 RX ADMIN — OXYCODONE HYDROCHLORIDE 10 MG: 10 TABLET ORAL at 11:32

## 2022-09-05 RX ADMIN — Medication 10 ML: at 08:23

## 2022-09-05 RX ADMIN — ENOXAPARIN SODIUM 30 MG: 100 INJECTION SUBCUTANEOUS at 08:22

## 2022-09-05 RX ADMIN — ACETAMINOPHEN 1000 MG: 500 TABLET ORAL at 06:06

## 2022-09-05 RX ADMIN — Medication 10 ML: at 22:37

## 2022-09-05 RX ADMIN — OXYCODONE HYDROCHLORIDE 10 MG: 10 TABLET ORAL at 06:16

## 2022-09-05 RX ADMIN — METHOCARBAMOL 1500 MG: 500 TABLET ORAL at 13:05

## 2022-09-05 RX ADMIN — METHOCARBAMOL 1500 MG: 500 TABLET ORAL at 08:22

## 2022-09-05 RX ADMIN — ACETAMINOPHEN 1000 MG: 500 TABLET ORAL at 22:08

## 2022-09-05 RX ADMIN — METHOCARBAMOL 1500 MG: 500 TABLET ORAL at 17:11

## 2022-09-05 RX ADMIN — OXYCODONE HYDROCHLORIDE 10 MG: 10 TABLET ORAL at 15:59

## 2022-09-05 RX ADMIN — METHOCARBAMOL 1500 MG: 500 TABLET ORAL at 22:08

## 2022-09-05 RX ADMIN — SENNOSIDES AND DOCUSATE SODIUM 1 TABLET: 8.6; 5 TABLET ORAL at 08:29

## 2022-09-05 RX ADMIN — OXYCODONE HYDROCHLORIDE 10 MG: 10 TABLET ORAL at 22:07

## 2022-09-05 ASSESSMENT — PAIN DESCRIPTION - ORIENTATION
ORIENTATION: RIGHT

## 2022-09-05 ASSESSMENT — PAIN DESCRIPTION - LOCATION
LOCATION: FLANK
LOCATION: CHEST
LOCATION: CHEST;BACK
LOCATION: RIB CAGE

## 2022-09-05 ASSESSMENT — PAIN DESCRIPTION - DESCRIPTORS
DESCRIPTORS: ACHING;DISCOMFORT
DESCRIPTORS: ACHING;DISCOMFORT;DULL
DESCRIPTORS: ACHING;DISCOMFORT
DESCRIPTORS: JABBING
DESCRIPTORS: ACHING
DESCRIPTORS: ACHING;DISCOMFORT;DULL

## 2022-09-05 ASSESSMENT — PAIN SCALES - GENERAL
PAINLEVEL_OUTOF10: 7
PAINLEVEL_OUTOF10: 3
PAINLEVEL_OUTOF10: 5
PAINLEVEL_OUTOF10: 7
PAINLEVEL_OUTOF10: 4
PAINLEVEL_OUTOF10: 5
PAINLEVEL_OUTOF10: 7

## 2022-09-05 NOTE — PROGRESS NOTES
Relevant imaging was reviewed. Chest tube sutures were cut and removed. Right chest tube removed at beside after maximal inspiration with breath held  No drainage. No evidence of infection. No audible air leak. No complications. Occlusive dressing placed. Pt stable without any changes in vital signs. Patient tolerated well.   CXR ordered for 4hr after removal.    Kenneth Wilcox DO  Resident, PGY-1  9/5/2022  3:52 PM

## 2022-09-05 NOTE — PROGRESS NOTES
Called to beside for bleeding from chest tube site with dressing saturated in blood. On examination no active bleeding from site, small hematoma inferiorly that is soft to palpation and non-tender. Minimal bloody output from chest tube. Continue to wall suction. Cover with dry 4x4.  We will reexamine in am.     Electronically signed by Trina Acosta MD on 9/5/2022 at 12:01 AM

## 2022-09-05 NOTE — PROGRESS NOTES
Occupational Therapy  Attempted OT eval at b/s, however, Nsg requested therapist return at a later time. Will attempt Assessment next date.   Thank you for this referral. Eun Prasad, MOT, OTR/L  # [phone]

## 2022-09-05 NOTE — DISCHARGE SUMMARY
Physician Discharge Summary     Patient ID:  Blanca Fernandez  [id number]  72 y.o.  1965    Admit date: 9/4/2022    Discharge date and time: 9/6/2022  5:24 PM     Admitting Physician: Leif Cain MD     Admission Diagnoses: Pneumothorax, closed, traumatic, initial encounter [S27. 0XXA]  Closed fracture of one rib of right side, initial encounter Cleopatra Baca  Fall from stairs [W10. 9XXA]    Discharge Diagnoses: Principal Problem:    Pneumothorax, closed, traumatic, initial encounter  Active Problems:    Closed fracture of multiple ribs of right side 3-4    ETOH abuse  Resolved Problems:    * No resolved hospital problems. *      Admission Condition: poor    Discharged Condition: stable    Indication for Admission: fall down multiple steps     Hospital Course/Procedures/Operation/treatments:   9/4: trauma team. Injury occurred several hours prior to arrival. The patient was unlocking a door, when he lost his balance and fell backwards down 10-15 steps. EtOH+. Reportedly pale, diaphoretic, and lethargic en route. Hypotensive prior to arrival with improvement of BP with IVF. GCS 15 on arrival. Imaging showed right rib 3-4 fx with pneumothorax. CT placed to suction. No new findings on tertiary. Possible R maxillary or R orbit fracture on imaging. Admitted to tele floor  9/5: Called to the bedside overnight for bleeding around the chest tube site which stopped on its own. No airleak this morning. 1000 on [facility]. Placed to water seal. CXR showed no visible pneumothorax. Chest tube removed this afternoon, stable repeat CXR.  9/6: No overnight events. Patient feeling better with breathing treatments yesterday. SMI 1500 this morning. Plan for d/c today. Consults:   IP CONSULT TO SOCIAL WORK    Significant Diagnostic Studies:   XR PELVIS (1-2 VIEWS)    Result Date: 9/4/2022  EXAMINATION: ONE XRAY VIEW OF THE PELVIS 9/4/2022 8:21 am COMPARISON: None.  HISTORY: ORDERING SYSTEM PROVIDED HISTORY: CHANTAL TECHNOLOGIST PROVIDED HISTORY: Reason for exam:->TRAUAMA What reading provider will be dictating this exam?->CRC FINDINGS: No fracture or dislocation. The right and left femoroacetabular joint spaces appear preserved. Bilateral SI joints remain open and are not widened. Degenerative arthritis with marginal spurring of the right SI joint, inferiorly. Degenerative changes of the lower lumbar spine. No fracture or dislocation. No acute disease identified. CT HEAD WO CONTRAST    Result Date: 9/4/2022  EXAMINATION: CT OF THE HEAD WITHOUT CONTRAST  9/4/2022 7:16 am TECHNIQUE: CT of the head was performed without the administration of intravenous contrast. Automated exposure control, iterative reconstruction, and/or weight based adjustment of the mA/kV was utilized to reduce the radiation dose to as low as reasonably achievable. COMPARISON: None. HISTORY: ORDERING SYSTEM PROVIDED HISTORY: trauma TECHNOLOGIST PROVIDED HISTORY: Has a \"code stroke\" or \"stroke alert\" been called? ->No Reason for exam:->trauma What reading provider will be dictating this exam?->CRC FINDINGS: BRAIN/VENTRICLES: There is no acute intracranial hemorrhage, mass effect or midline shift. No abnormal extra-axial fluid collection. The gray-white differentiation is maintained without evidence of an acute infarct. There is no evidence of hydrocephalus. ORBITS: The visualized portion of the orbits demonstrate no acute abnormality. SINUSES: The visualized paranasal sinuses and mastoid air cells demonstrate no acute abnormality. Mucous retention cyst identified within the left maxillary sinus. SOFT TISSUES/SKULL:  No acute abnormality of the visualized skull or soft tissues. No acute intracranial abnormality.      CT FACIAL BONES WO CONTRAST    Result Date: 9/4/2022  EXAMINATION: CT OF THE FACE WITHOUT CONTRAST  9/4/2022 7:16 am TECHNIQUE: CT of the face was performed without the administration of intravenous contrast. Multiplanar reformatted images are provided for review. Automated exposure control, iterative reconstruction, and/or weight based adjustment of the mA/kV was utilized to reduce the radiation dose to as low as reasonably achievable. COMPARISON: None HISTORY: ORDERING SYSTEM PROVIDED HISTORY: trauma TECHNOLOGIST PROVIDED HISTORY: Reason for exam:->trauma Decision Support Exception - unselect if not a suspected or confirmed emergency medical condition->Emergency Medical Condition (MA) What reading provider will be dictating this exam?->CRC FINDINGS: FACIAL BONES: The frontal sinuses, orbital walls, maxilla, pterygoid plates, zygomatic arches, hard palate, nasal bones and mandible are intact. The temporomandibular joints are aligned. ORBITAL CONTENTS: The globes appear intact. The extraocular muscles, optic nerve sheath complexes and lacrimal glands appear unremarkable. No retrobulbar hematoma or mass is seen. SINUSES: There is no evidence of acute sinusitis, such as air fluid level. The mastoid air cells are clear. Mucous retention cyst in the left maxillary sinus. There is nasal septal deviation identified to the right. SOFT TISSUES: No superficial facial soft tissue swelling is seen. No acute facial bone trauma. CT CHEST W CONTRAST    Result Date: 9/4/2022  EXAMINATION: CT OF THE CHEST WITH CONTRAST 9/4/2022 7:16 am TECHNIQUE: CT of the chest was performed with the administration of intravenous contrast. Multiplanar reformatted images are provided for review. Automated exposure control, iterative reconstruction, and/or weight based adjustment of the mA/kV was utilized to reduce the radiation dose to as low as reasonably achievable. COMPARISON: None.  HISTORY: ORDERING SYSTEM PROVIDED HISTORY: trauma TECHNOLOGIST PROVIDED HISTORY: Reason for exam:->trauma Decision Support Exception - unselect if not a suspected or confirmed emergency medical condition->Emergency Medical Condition (MA) What reading provider will be dictating this exam?->CRC FINDINGS: Mediastinum: Thyroid is homogeneous in appearance. No mediastinal mass or adenopathy. The cardiac chambers are within normal limits. No pericardial effusion. No bulky hilar or axillary lymphadenopathy. Lungs/pleura: There is a moderate size pneumothorax identified on the right. Atelectatic changes identified posteriorly within the dependent portion of the right lung. There is atelectatic changes as well seen within the left lung base. No superimposed acute infectious process. No pleural effusion. Upper Abdomen: The visualized upper abdomen is grossly unremarkable. Soft Tissues/Bones: Nondisplaced fracture seen of the right anterior lateral 3rd rib. There is associated pleural thickening. Moderate size right pneumothorax with fracture seen of the right anterolateral 3rd rib with associated  pleural thickening. Remainder of the chest is grossly unremarkable. Findings were discussed with Joe Lucero at time of interpretation of the examination. CT CERVICAL SPINE WO CONTRAST    Result Date: 9/4/2022  EXAMINATION: CT OF THE CERVICAL SPINE WITHOUT CONTRAST 9/4/2022 7:16 am TECHNIQUE: CT of the cervical spine was performed without the administration of intravenous contrast. Multiplanar reformatted images are provided for review. Automated exposure control, iterative reconstruction, and/or weight based adjustment of the mA/kV was utilized to reduce the radiation dose to as low as reasonably achievable. COMPARISON: None. HISTORY: ORDERING SYSTEM PROVIDED HISTORY: trauma TECHNOLOGIST PROVIDED HISTORY: Reason for exam:->trauma Decision Support Exception - unselect if not a suspected or confirmed emergency medical condition->Emergency Medical Condition (MA) What reading provider will be dictating this exam?->CRC FINDINGS: BONES/ALIGNMENT: There is no acute fracture or traumatic malalignment. Slight straightening identified of the normal lordosis of the cervical spine.  Lateral masses are well aligned. The odontoid tip is unremarkable. DEGENERATIVE CHANGES: No significant degenerative changes. SOFT TISSUES: There is no prevertebral soft tissue swelling. Pneumothorax identified at the right lung apex. No acute abnormality of the cervical spine. CT THORACIC SPINE WO CONTRAST    Result Date: 9/4/2022  EXAMINATION: CT OF THE THORACIC SPINE WITHOUT CONTRAST  9/4/2022 7:16 am: TECHNIQUE: CT of the thoracic spine was performed without the administration of intravenous contrast. Multiplanar reformatted images are provided for review. Automated exposure control, iterative reconstruction, and/or weight based adjustment of the mA/kV was utilized to reduce the radiation dose to as low as reasonably achievable. COMPARISON: None. HISTORY: ORDERING SYSTEM PROVIDED HISTORY: trauma TECHNOLOGIST PROVIDED HISTORY: Reason for exam:->trauma What reading provider will be dictating this exam?->CRC FINDINGS: BONES/ALIGNMENT: There is normal alignment of the spine. The vertebral body heights are maintained. No osseous destructive lesion is seen. DEGENERATIVE CHANGES: No gross spinal canal stenosis or bony neural foraminal narrowing of the thoracic spine. No significant degenerative changes seen within the spine. SOFT TISSUES: No paraspinal mass is seen. Pneumothorax identified on the right. No acute fracture or malalignment. CT LUMBAR SPINE WO CONTRAST    Result Date: 9/4/2022  EXAMINATION: CT OF THE LUMBAR SPINE WITHOUT CONTRAST  9/4/2022 TECHNIQUE: CT of the lumbar spine was performed without the administration of intravenous contrast. Multiplanar reformatted images are provided for review. Adjustment of mA and/or kV according to patient size was utilized. Automated exposure control, iterative reconstruction, and/or weight based adjustment of the mA/kV was utilized to reduce the radiation dose to as low as reasonably achievable.  COMPARISON: None HISTORY: ORDERING SYSTEM PROVIDED HISTORY: trauma TECHNOLOGIST PROVIDED HISTORY: Reason for exam:->trauma What reading provider will be dictating this exam?->CRC FINDINGS: BONES/ALIGNMENT: There is normal alignment of the spine. The vertebral body heights are maintained. No osseous destructive lesion is seen. DEGENERATIVE CHANGES: Multilevel degenerative changes seen within the lumbar spine with anterior spurring seen at the L3, L4, L5 levels with disc space narrowing and sclerosis of the endplates at the L4/N5 level. With Schmorl's nodes seen in the inferior endplate of the L4 level. Posterior osteophyte formation seen at the L4/L5 and L5/S1 level. SOFT TISSUES/RETROPERITONEUM: No paraspinal mass is seen. Multilevel degenerative changes seen within the lumbar spine with no evidence of fracture or dislocation. CT ABDOMEN PELVIS W IV CONTRAST Additional Contrast? None    Result Date: 9/4/2022  EXAMINATION: CT OF THE ABDOMEN AND PELVIS WITH CONTRAST 9/4/2022 7:16 am TECHNIQUE: CT of the abdomen and pelvis was performed with the administration of intravenous contrast. Multiplanar reformatted images are provided for review. Automated exposure control, iterative reconstruction, and/or weight based adjustment of the mA/kV was utilized to reduce the radiation dose to as low as reasonably achievable. COMPARISON: None. HISTORY: ORDERING SYSTEM PROVIDED HISTORY: trauma TECHNOLOGIST PROVIDED HISTORY: Additional Contrast?->None Reason for exam:->trauma What reading provider will be dictating this exam?->CRC FINDINGS: Lower Chest: Moderate size pneumothorax identified on the right with some collapse identified of the right lung base. Minimal atelectatic changes seen at the left lung base. Organs: Diffuse fatty infiltration liver. The spleen is unremarkable. There is no stones in the gallbladder. There is is no abnormality within the adrenal glands. Symmetric enhancement of the renal parenchyma. No stones or distension seen in the renal collecting system.  GI/Bowel: Stomach is unremarkable. No wall thickening. The small bowel is within normal limits. No mucosal abnormality. Stool seen scattered diffusely throughout the colon. Diverticulosis with no evidence of diverticulitis. Pelvis: Pelvic organs reveal incomplete distension identified of the bladder. No wall thickening. The prostate is unremarkable. Peritoneum/Retroperitoneum: No abdominal retroperitoneal lymphadenopathy. No free fluid or free air. No abnormal mass or fluid collections identified. Bones/Soft Tissues: Bony structures reveal minimal degenerative changes seen within the spine. There is no evidence of ventral abdominal wall mass or defect. Mild diastasis of the rectus muscles. Bilateral inguinal hernias left greater than right containing fat only. No CT evidence of acute intra-abdominopelvic abnormality. Moderate size right pneumothorax with atelectatic changes seen at the right lung base. XR CHEST PORTABLE    Result Date: 9/4/2022  EXAMINATION: ONE XRAY VIEW OF THE CHEST PORTABLE UPRIGHT 9/4/2022 9:00 am COMPARISON: 0710 hours HISTORY: ORDERING SYSTEM PROVIDED HISTORY: R chest tube TECHNOLOGIST PROVIDED HISTORY: Reason for exam:->R chest tube What reading provider will be dictating this exam?->CRC FINDINGS: Interval insertion of a large caliber right chest tube with the tube tip at the mid right hemithorax. No pneumothorax is seen. Interval development of right chest wall subcutaneous emphysema. No mediastinal shift. Normal heart size. No focal pulmonary infiltrate or pulmonary contusion is seen. The left lung appears clear. No pleural fluid collections are seen. Redemonstration of mildly displaced fracture of the axillary right 3rd rib. 1.  Right chest tube in place with right chest wall soft tissue emphysema and without findings of right pneumothorax. 2.  The lungs are essentially clear.      XR CHEST 1 VIEW    Result Date: 9/4/2022  EXAMINATION: ONE XRAY VIEW OF THE CHEST PORTABLE OVERHEAD 9/4/2022 07:10am COMPARISON: None. HISTORY: ORDERING SYSTEM PROVIDED HISTORY: TRAUMA TECHNOLOGIST PROVIDED HISTORY: Reason for exam:->TRAUMA What reading provider will be dictating this exam?->CRC FINDINGS: Low lung volumes and limited portable overhead technique. Mildly displaced fracture of the axillary right 3rd rib and small right pneumothorax. No definite mediastinal shift. The heart is normal in size. No focal pulmonary infiltrate. No pleural fluid collection seen. 1.  Acute fracture of the axillary right 3rd rib and small right pneumothorax. 2.  No focal pulmonary infiltrate or pulmonary contusion identified. Discharge Exam:  GENERAL:  Laying in bed, awake, alert, cooperative, no apparent distress  NEUROLOGIC:  GCS 15, no focal deficits  HEAD: Normocephalic, atraumatic  EYES: No sclera icterus, PERRL, EOMI  LUNGS:  No increased work of breathing on room air. [facility] 1500  CARDIOVASCULAR:  RRR, no M/R/G  ABDOMEN:  Soft, non-tender, non-distended  EXTREMITIES: No edema or swelling  SKIN: Warm and dry    Disposition: home    In process/preliminary results:  Outstanding Order Results       No orders found from 8/6/2022 to 9/5/2022. Patient Instructions: There are no discharge medications for this patient. RIB FRACTURE DISCHARGE INSTRUCTIONS    Your ribs are curved bones in your chest that protect inner structures like your lungs and heart. The ribs expand and contract when you breathe. Pain can result making it hard to cough or breathe deeply. The difficulty increases if several ribs are broken on both sides. You are likely to heal in 6 to 8 weeks, but may take longer if you are elderly. Most rib fractures heal on their own with no lasting effects. Treatment:   Take the medications given to you as prescribed. Your pain may not go completely away after discharge from the hospital, but we want your pain tolerable so you can take deep breaths.   As your pain becomes controlled you may switch to taking over-the-counter medications such as Tylenol and or Ibuprofen as directed. Tylenol (acetaminophen) 1000mg every 6 hours or you may take 650mg every 4 hours. Ibuprofen up to 800mg every 8 hours. (Please take with food or milk). Over the counter creams and patches such as Salon Pas, [employer], Aspercream, can be useful as well. You were likely given a breathing device called an incentive spirometer while in the hospital to practice deep breathing. It is advised to continue this several times a day while at home to prevent pneumonia. Prevent Complications:  Try to take 5-10 deep breaths every hour while awake. Use the incentive spirometer often. Set goals of deeper breathing every couple of days until reaching normal adult level of about 2500 ml on the printed scale. Activity:  Avoid further chest injury. Plan quiet activity for the first few days. Do not stay in bed. Walk around and move. No rough activities with family, friends or pets. No sports, jumping, jogging or gymnastics. Ask your doctor or the trauma clinic when you will be able to resume these activities. Symptoms to Report:  Call your doctor right away if you notice any of these symptoms:   Increased chest pain  Shortness of breath  Fever  Coughing up blood    Call 911 immediately if these symptoms are severe. Follow-up:  Trauma Clinic: [phone] option 2  [address], [place] [address]    Call [phone], option 2, for any questions/concerns and for follow-up appointment in 1 week(s). Please follow the instructions checked below:    During the course of your workup, we identified an incidental finding of:  diverticulosis, bilateral inguinal hernias  Please follow-up with your primary care provider.     ACTIVITY INSTRUCTIONS  Increase activity as tolerated  No heavy lifting or strenuous activity  Take your incentive spirometer home and use 4-6 times/day   []  No driving until cleared by Dr. Mariana Cabrera in officce    WOUND/DRESSING INSTRUCTIONS:  You may shower. No sitting in bath tub, hot tub or swimming until cleared by physician. Ice to areas of pain for first 24 hours. Heat to areas of pain after that. Wash areas of lacerations/abrasions with soap & water. Rinse well. Pat dry with clean towel. Apply thin layer of Bacitracin, Neosporin, or triple antibiotic cream to affected area 2-3 times per day. Keep wounds clean and dry. []  Sutures/Staples are to be removed in 10 day(s). MEDICATION INSTRUCTIONS  Take medication as prescribed. When taking pain medications, you may experience dizziness or drowsiness. Do not drink alcohol or drive when taking these medications. You may experience constipation while taking pain medication. You may take over the counter stool softeners such as docusate (Colace), sennosides S (Senokot-S), or Miralax.   []  You may take Ibuprofen (over the counter) as directed for mild pain. --You may take up to 800mg every 8 hours for pain, please take with food or milk.   []  You may take acetaminophen (Tylenol) products. Do NOT take more than 4000mg of Tylenol in 24h. []  Do not take any other acetaminophen (Tylenol) products if you are taking Percocet or Norco, as these contain Tylenol. --Do NOT take more than 4000mg of Tylenol in 24h. OPIOID MEDICATION INSTRUCTIONS  Read the medication guide that is included with your prescription. Take your medication exactly as prescribed. Store medication away from children and in a safe place. Do NOT share your medication with others. Do NOT take medication unless it is prescribed for you. Do NOT drink alcohol while taking opioids (I.e., Norco, Percocet, Oxycodone, etc).   Discuss with the Trauma Clinic staff if the dose of medication you are taking does not control your pain and any side effects that you may be having. CALL 911 OR YOUR LOCAL EMERGENCY SERVICE:  --If you take too much medication  --If you have trouble breathing or shortness of breath  --A child has taken this medication. WORK:  You may not return to work until you receive follow-up with the Trauma Clinic or clearance by all consultants. Call the trauma clinic for any of the following or for questions/concerns;  --fever over 101F  --redness, swelling, hardness or warmth at the wound site(s).   --Unrelieved nausea/vomiting  --Foul smelling or cloudy drainage at the wound site(s)  --Unrelieved pain or increase in pain  --Increase in shortness of breath    Follow-up:  Trauma Clinic: [phone] option [address]  [place], [id number] [address]      Follow up:   [address]  [address] [facility] [id number]  Follow up in 2 week(s)       Signed:  James Brito MD  9/6/2022  12:37 PM

## 2022-09-05 NOTE — PROGRESS NOTES
GENERAL SURGERY  DAILY PROGRESS NOTE  9/5/2022  Chief Complaint   Patient presents with    Trauma       Subjective:  Admitted yesterday after falling down stairs. Found to have right-sided 3 through 4 rib fractures with associated pneumothorax for which a chest tube was placed. SMI 1000. Objective:  BP (!) 140/91   Pulse 96   Temp 96.8 °F (36 °C) (Temporal)   Resp 16   Ht 5' 11\" (1.803 m)   Wt 185 lb (83.9 kg)   SpO2 99%   BMI 25.80 kg/m²     In: -   Out: 30     General appearance: NAD, appears stated age, GCS 15  Head: NCAT, PERRLA, EOMI, red conjunctiva, right periorbital ecchymosis  Neck: supple, no masses, trachea midline  Lungs: Equal chest rise bilaterally, no retractions, no audible wheezing, clear to auscultation bilaterally anterior lung fields, chest tube with sanguinous output 30 mL reported since placement  Heart: Reg rate no extra heart sounds   Abdomen: soft, NTTP throughout, ND, right flank abrasions  Extremities: atraumatic, no focal motor deficits, no open wounds  Psych: no tremor, visual hallucinations      Assessment/Plan:  61 y.o. male presenting after fall downstairs found to have right sided 3 through 4 rib fractures with pneumothorax status post chest tube placement on 9/4. This morning without air leak.    -water seal chest tube - CXR this morning  -Multimodal pain control  -Bowel regimen  -Ambulate, pulmonary hygiene  -Regular diet  -PT/OT evaluations  -DVT prophylaxis      Héctor Chun MD  General Surgery Resident, PGY-4    Electronically signed on 9/5/2022 at 6:00 AM      Attending Attestation   Patient seen and examined, agree with resident note except for changes made by me, for remaining HP/Consult/progress note details please see resident HP/Consult/progress note.       - CXR, if ok d/c chest tube,   - BPH pain control   - ptot d/c planning     Nickolas Ritter MD

## 2022-09-06 VITALS
HEART RATE: 73 BPM | DIASTOLIC BLOOD PRESSURE: 97 MMHG | HEIGHT: 71 IN | WEIGHT: 185 LBS | TEMPERATURE: 97 F | OXYGEN SATURATION: 95 % | RESPIRATION RATE: 18 BRPM | SYSTOLIC BLOOD PRESSURE: 146 MMHG | BODY MASS INDEX: 25.9 KG/M2

## 2022-09-06 PROBLEM — S22.41XA CLOSED FRACTURE OF MULTIPLE RIBS OF RIGHT SIDE: Status: ACTIVE | Noted: 2022-09-06

## 2022-09-06 PROBLEM — S22.31XA CLOSED FRACTURE OF ONE RIB OF RIGHT SIDE: Status: ACTIVE | Noted: 2022-09-06

## 2022-09-06 PROBLEM — F10.10 ETOH ABUSE: Status: ACTIVE | Noted: 2022-09-06

## 2022-09-06 LAB
ANION GAP SERPL CALCULATED.3IONS-SCNC: 9 MMOL/L (ref 7–16)
BASOPHILS ABSOLUTE: 0.02 E9/L (ref 0–0.2)
BASOPHILS RELATIVE PERCENT: 0.4 % (ref 0–2)
BUN BLDV-MCNC: 10 MG/DL (ref 6–20)
CALCIUM SERPL-MCNC: 8.9 MG/DL (ref 8.6–10.2)
CHLORIDE BLD-SCNC: 101 MMOL/L (ref 98–107)
CO2: 25 MMOL/L (ref 22–29)
CREAT SERPL-MCNC: 0.8 MG/DL (ref 0.7–1.2)
EOSINOPHILS ABSOLUTE: 0.03 E9/L (ref 0.05–0.5)
EOSINOPHILS RELATIVE PERCENT: 0.6 % (ref 0–6)
GFR AFRICAN AMERICAN: >60
GFR NON-AFRICAN AMERICAN: >60 ML/MIN/1.73
GLUCOSE BLD-MCNC: 100 MG/DL (ref 74–99)
HCT VFR BLD CALC: 36.3 % (ref 37–54)
HEMOGLOBIN: 11.8 G/DL (ref 12.5–16.5)
IMMATURE GRANULOCYTES #: 0.02 E9/L
IMMATURE GRANULOCYTES %: 0.4 % (ref 0–5)
LYMPHOCYTES ABSOLUTE: 1.32 E9/L (ref 1.5–4)
LYMPHOCYTES RELATIVE PERCENT: 27.3 % (ref 20–42)
MCH RBC QN AUTO: 32.2 PG (ref 26–35)
MCHC RBC AUTO-ENTMCNC: 32.5 % (ref 32–34.5)
MCV RBC AUTO: 99.2 FL (ref 80–99.9)
MONOCYTES ABSOLUTE: 0.42 E9/L (ref 0.1–0.95)
MONOCYTES RELATIVE PERCENT: 8.7 % (ref 2–12)
NEUTROPHILS ABSOLUTE: 3.02 E9/L (ref 1.8–7.3)
NEUTROPHILS RELATIVE PERCENT: 62.6 % (ref 43–80)
PDW BLD-RTO: 12.9 FL (ref 11.5–15)
PLATELET # BLD: 160 E9/L (ref 130–450)
PMV BLD AUTO: 9.6 FL (ref 7–12)
POTASSIUM REFLEX MAGNESIUM: 4.3 MMOL/L (ref 3.5–5)
RBC # BLD: 3.66 E12/L (ref 3.8–5.8)
SODIUM BLD-SCNC: 135 MMOL/L (ref 132–146)
WBC # BLD: 4.8 E9/L (ref 4.5–11.5)

## 2022-09-06 PROCEDURE — 97165 OT EVAL LOW COMPLEX 30 MIN: CPT

## 2022-09-06 PROCEDURE — 6360000002 HC RX W HCPCS: Performed by: STUDENT IN AN ORGANIZED HEALTH CARE EDUCATION/TRAINING PROGRAM

## 2022-09-06 PROCEDURE — 2580000003 HC RX 258: Performed by: STUDENT IN AN ORGANIZED HEALTH CARE EDUCATION/TRAINING PROGRAM

## 2022-09-06 PROCEDURE — 36415 COLL VENOUS BLD VENIPUNCTURE: CPT

## 2022-09-06 PROCEDURE — 6370000000 HC RX 637 (ALT 250 FOR IP): Performed by: STUDENT IN AN ORGANIZED HEALTH CARE EDUCATION/TRAINING PROGRAM

## 2022-09-06 PROCEDURE — 80048 BASIC METABOLIC PNL TOTAL CA: CPT

## 2022-09-06 PROCEDURE — 99238 HOSP IP/OBS DSCHRG MGMT 30/<: CPT | Performed by: SURGERY

## 2022-09-06 PROCEDURE — 97161 PT EVAL LOW COMPLEX 20 MIN: CPT

## 2022-09-06 PROCEDURE — 85025 COMPLETE CBC W/AUTO DIFF WBC: CPT

## 2022-09-06 RX ORDER — LIDOCAINE 4 G/G
1 PATCH TOPICAL DAILY
Qty: 15 PATCH | Refills: 0 | Status: SHIPPED | OUTPATIENT
Start: 2022-09-06 | End: 2022-09-23

## 2022-09-06 RX ORDER — OXYCODONE HYDROCHLORIDE 5 MG/1
5 TABLET ORAL EVERY 6 HOURS PRN
Qty: 28 TABLET | Refills: 0 | Status: SHIPPED | OUTPATIENT
Start: 2022-09-06 | End: 2022-09-13

## 2022-09-06 RX ORDER — METHOCARBAMOL 750 MG/1
1500 TABLET, FILM COATED ORAL 3 TIMES DAILY
Qty: 60 TABLET | Refills: 0 | Status: SHIPPED | OUTPATIENT
Start: 2022-09-06 | End: 2022-09-16

## 2022-09-06 RX ORDER — POLYETHYLENE GLYCOL 3350 17 G/17G
17 POWDER, FOR SOLUTION ORAL DAILY
Qty: 527 G | Refills: 1 | Status: SHIPPED | OUTPATIENT
Start: 2022-09-07 | End: 2022-09-23

## 2022-09-06 RX ORDER — METHOCARBAMOL 750 MG/1
1500 TABLET, FILM COATED ORAL 4 TIMES DAILY
Qty: 80 TABLET | Refills: 0 | Status: CANCELLED | OUTPATIENT
Start: 2022-09-06 | End: 2022-09-16

## 2022-09-06 RX ADMIN — METHOCARBAMOL 1500 MG: 500 TABLET ORAL at 10:33

## 2022-09-06 RX ADMIN — Medication 10 ML: at 12:37

## 2022-09-06 RX ADMIN — SENNOSIDES AND DOCUSATE SODIUM 1 TABLET: 8.6; 5 TABLET ORAL at 10:33

## 2022-09-06 RX ADMIN — OXYCODONE HYDROCHLORIDE 10 MG: 10 TABLET ORAL at 10:36

## 2022-09-06 RX ADMIN — ENOXAPARIN SODIUM 30 MG: 100 INJECTION SUBCUTANEOUS at 10:33

## 2022-09-06 RX ADMIN — ACETAMINOPHEN 1000 MG: 500 TABLET ORAL at 06:32

## 2022-09-06 ASSESSMENT — PAIN SCALES - GENERAL
PAINLEVEL_OUTOF10: 10
PAINLEVEL_OUTOF10: 0
PAINLEVEL_OUTOF10: 7

## 2022-09-06 ASSESSMENT — PAIN DESCRIPTION - LOCATION: LOCATION: RIB CAGE

## 2022-09-06 ASSESSMENT — PAIN DESCRIPTION - ORIENTATION: ORIENTATION: RIGHT

## 2022-09-06 ASSESSMENT — PAIN DESCRIPTION - DESCRIPTORS: DESCRIPTORS: ACHING;CRAMPING;DISCOMFORT

## 2022-09-06 NOTE — DISCHARGE INSTRUCTIONS
RIB FRACTURE DISCHARGE INSTRUCTIONS    Your ribs are curved bones in your chest that protect inner structures like your lungs and heart. The ribs expand and contract when you breathe. Pain can result making it hard to cough or breathe deeply. The difficulty increases if several ribs are broken on both sides. You are likely to heal in 6 to 8 weeks, but may take longer if you are elderly. Most rib fractures heal on their own with no lasting effects. Treatment:   Take the medications given to you as prescribed. Your pain may not go completely away after discharge from the hospital, but we want your pain tolerable so you can take deep breaths. As your pain becomes controlled you may switch to taking over-the-counter medications such as Tylenol and or Ibuprofen as directed. Tylenol (acetaminophen) 1000mg every 6 hours or you may take 650mg every 4 hours. Ibuprofen up to 800mg every 8 hours. (Please take with food or milk). Over the counter creams and patches such as Salon Pas, [employer], Aspercream, can be useful as well. You were likely given a breathing device called an incentive spirometer while in the hospital to practice deep breathing. It is advised to continue this several times a day while at home to prevent pneumonia. Prevent Complications:  Try to take 5-10 deep breaths every hour while awake. Use the incentive spirometer often. Set goals of deeper breathing every couple of days until reaching normal adult level of about 2500 ml on the printed scale. Activity:  Avoid further chest injury. Plan quiet activity for the first few days. Do not stay in bed. Walk around and move. No rough activities with family, friends or pets. No sports, jumping, jogging or gymnastics. Ask your doctor or the trauma clinic when you will be able to resume these activities.     Symptoms to Report:  Call your doctor right away if you notice any of these symptoms:   Increased chest pain  Shortness of breath  Fever  Coughing up blood    Call 911 immediately if these symptoms are severe. Follow-up:  Trauma Clinic: [phone] option 2  [address], [place] [address]    Call [phone], option 2, for any questions/concerns and for follow-up appointment in 1 week(s). Please follow the instructions checked below:    During the course of your workup, we identified an incidental finding of:  diverticulosis, bilateral inguinal hernias  Please follow-up with your primary care provider. ACTIVITY INSTRUCTIONS  Increase activity as tolerated  No heavy lifting or strenuous activity  Take your incentive spirometer home and use 4-6 times/day   []  No driving until cleared by Dr. Evelyn Ambrocio in officce    WOUND/DRESSING INSTRUCTIONS:  You may shower. No sitting in bath tub, hot tub or swimming until cleared by physician. Ice to areas of pain for first 24 hours. Heat to areas of pain after that. Wash areas of lacerations/abrasions with soap & water. Rinse well. Pat dry with clean towel. Apply thin layer of Bacitracin, Neosporin, or triple antibiotic cream to affected area 2-3 times per day. Keep wounds clean and dry. []  Sutures/Staples are to be removed in 10 day(s). MEDICATION INSTRUCTIONS  Take medication as prescribed. When taking pain medications, you may experience dizziness or drowsiness. Do not drink alcohol or drive when taking these medications. You may experience constipation while taking pain medication. You may take over the counter stool softeners such as docusate (Colace), sennosides S (Senokot-S), or Miralax.   []  You may take Ibuprofen (over the counter) as directed for mild pain. --You may take up to 800mg every 8 hours for pain, please take with food or milk.   []  You may take acetaminophen (Tylenol) products. Do NOT take more than 4000mg of Tylenol in 24h.    []  Do not take any other acetaminophen (Tylenol) products if you are taking Percocet or Norco, as these contain Tylenol. --Do NOT take more than 4000mg of Tylenol in 24h. OPIOID MEDICATION INSTRUCTIONS  Read the medication guide that is included with your prescription. Take your medication exactly as prescribed. Store medication away from children and in a safe place. Do NOT share your medication with others. Do NOT take medication unless it is prescribed for you. Do NOT drink alcohol while taking opioids (I.e., Norco, Percocet, Oxycodone, etc). Discuss with the Trauma Clinic staff if the dose of medication you are taking does not control your pain and any side effects that you may be having. CALL 911 OR YOUR LOCAL EMERGENCY SERVICE:  --If you take too much medication  --If you have trouble breathing or shortness of breath  --A child has taken this medication. WORK:  You may not return to work until you receive follow-up with the Trauma Clinic or clearance by all consultants. Call the trauma clinic for any of the following or for questions/concerns;  --fever over 101F  --redness, swelling, hardness or warmth at the wound site(s).   --Unrelieved nausea/vomiting  --Foul smelling or cloudy drainage at the wound site(s)  --Unrelieved pain or increase in pain  --Increase in shortness of breath    Follow-up:  Trauma Clinic: [phone] option [address]  [place], [id number] [address]

## 2022-09-06 NOTE — PLAN OF CARE
Problem: Discharge Planning  Goal: Discharge to home or other facility with appropriate resources  9/6/2022 1229 by Bill Dyer RN  Outcome: Progressing  Flowsheets (Taken 9/6/2022 1215)  Discharge to home or other facility with appropriate resources:   Identify barriers to discharge with patient and caregiver   Arrange for needed discharge resources and transportation as appropriate     Problem: Pain  Goal: Verbalizes/displays adequate comfort level or baseline comfort level  9/6/2022 1229 by Bill Dyer RN  Outcome: Progressing     Problem: Safety - Adult  Goal: Free from fall injury  9/6/2022 1229 by Bill Dyer RN  Outcome: Progressing  Flowsheets (Taken 9/6/2022 1228)  Free From Fall Injury: Instruct family/caregiver on patient safety

## 2022-09-06 NOTE — PROGRESS NOTES
CLINICAL PHARMACY NOTE: MEDS TO BEDS    Total # of Prescriptions Filled: 4   The following medications were delivered to the patient:  Methocarbamol 750 mg  Lidocaine patch 4%  Oxycodone 5 mg  Miralax  Delivered to pt    Additional Documentation:

## 2022-09-06 NOTE — CARE COORDINATION
Met with pt and s/o at bedside to discuss discharge / transition of care plan. Pt reports from home with s/o; independent of all ADL; active ; verified PCP and pharmacy; denies DME or needs; discharge plan is to return home with no needs, s/o to provide transport once medically stable.

## 2022-09-06 NOTE — PROGRESS NOTES
Physical Therapy    Initial Assessment     Name: Jeffry Ennis  : 1965  MRN: [id number]      Date of Service: 2022    Evaluating PT: Lia Krabbe, PT, DPT [id number]      Room #:  1051/0895-O  Diagnosis:  Pneumothorax, closed, traumatic, initial encounter [S27. 0XXA]  Closed fracture of one rib of right side, initial encounter Arland Blakes  Fall from stairs [W10. 9XXA]  Precautions:  R ribs 3-4 fxs    SUBJECTIVE:    Pt lives with significant other in a 3 story house with 1 stair(s) and no rail(s) to enter. Full flight of stairs and 2 rails to second floor bed and bath. Pt ambulated without AD prior to admission. OBJECTIVE:   Initial Evaluation  Date:    AM-PAC 6 Clicks    Was pt agreeable to Eval/treatment? Yes   Does pt have pain? -6/10 R chest pain \"straight through to my back\"; recently medicated   Bed Mobility  Rolling: NT  Supine to sit: Independent   Sit to supine: NT  Scooting: Independent    Transfers Sit to stand: Independent   Stand to sit: Independent   Stand pivot: Independent    Ambulation   200 feet Independent    Stair negotiation: ascended and descended NT   ROM BUE: WFL  BLE: WFL   Strength BUE: WFL  BLE: WFL   Balance Sitting EOB: Independent   Dynamic Standing: Independent      Pt is A & O x: 4 to person, place, month/year, and situation. Sensation: Pt denies numbness and tingling of extremities. Edema: Unremarkable. Patient education  Pt educated on PT role in acute care setting. Patient response to education:   Pt verbalized understanding Pt demonstrated skill Pt requires further education in this area   Yes  NA No     ASSESSMENT:    Comments:    Pt was in bed upon room entry, agreeable to PT evaluation. Significant other present throughout session. Pt ambulated around unit without difficulty. No LOB noted but pt did report slight increase in pain. O2 sat was 97-98% on RA with all activity.  Pt reports they are at functional baseline and that there are no skilled PT needs at this time. Pt was left in bed with all needs med at conclusion of session. PHYSICAL THERAPY PLAN OF CARE:    PT POC is established based on physician order and patient diagnosis     Based on pt's current level of independence for all functional mobility, this pt is not a candidate for continued skilled PT services. Pt is agreeable that there are no skilled PT needs at this time. Will complete order and remove pt from PT caseload. Please re-consult if pt experiences functional decline. Thank you. Referring provider/PT Order:    Start   Ordering Provider    09/04/22 1045  PT eval and treat  Start:  09/04/22 1045,   End:  09/04/22 1045,   ONE TIME,   Standing Count:  1 Occurrences,   R         Guerrero Lay,       Diagnosis:  Pneumothorax, closed, traumatic, initial encounter [S27. 0XXA]  Closed fracture of one rib of right side, initial encounter Cleopatra Baca  Fall from stairs [W10. 9XXA]    Time in  0845  Time out  0855    Total Treatment Time  0 minutes     Evaluation Time includes thorough review of current medical information, gathering information on past medical history/social history and prior level of function, completion of standardized testing/informal observation of tasks, assessment of data and education on plan of care and goals.     CPT codes:  [x] Low Complexity PT evaluation 66177  [] Moderate Complexity PT evaluation 81982  [] High Complexity PT evaluation 64614  [] PT Re-evaluation 68094  [] Gait training 65693 0 minutes  [] Manual therapy 06316 0 minutes  [] Therapeutic activities 99344 0 minutes  [] Therapeutic exercises 99385 0 minutes  [] Neuromuscular reeducation 18016 0 minutes     Pepe Perales, PT, DPT  [id number]

## 2022-09-06 NOTE — PROGRESS NOTES
[address] [place] [address]     Date:2022  Patient Name: Radha Del Real  MRN: [id number]  : 1965  Room: [address] [place] [address]      Evaluating OT: Roly Tuttle OTR/L; [id number]    Referring Provider[de-identified] Kiko Nelson DO     Specific Provider Orders/Date: OT evaluation and treatment 22    AM-PAC Daily Activity Raw Score:     Recommended Adaptive Equipment: None at this time     Diagnosis:    1. Pneumothorax, closed, traumatic, initial encounter    2. Fall from stairs    3. Closed fracture of one rib of right side, initial encounter       Patient Active Problem List   Diagnosis    Pneumothorax, closed, traumatic, initial encounter    Closed fracture of multiple ribs of right side 3-4    ETOH abuse      Pertinent Medical History: No past medical history on file. Precautions:  Falls,  R rib fx's     Assessment of current deficits N/A  [] Functional mobility   []ADLs  [] Strength               []Cognition   [] Functional transfers   [] IADLs         [] Safety Awareness   []Endurance   [] Fine Coordination              [] Balance      [] Vision/perception   []Sensation    []Gross Motor Coordination  [] ROM  [] Delirium                   [] Motor Control     OT PLAN OF CARE   OT POC based on physician orders, patient diagnosis and results of clinical assessment    Frequency/Duration : NA  Specific OT Treatment to include: NA      Home Living: Pt lives with significant other in a 3 story with 1 step(s) to enter and 0 rail(s); bed/bath on 2nd floor with full flight of stairs & 1 handrail. Bathroom setup: [address] [place] [address] owned: None  Prior Level of Function: Independent  with ADLs , Independent  with IADLs; using no AD for functional mobility.    Driving: Yes  Occupation: Works full-time    Pain Level: Pt c/o 7/10 R rib pain; therapist provided repositioning techniques  Cognition: A&O: 4/4; Follows multi step directions   Memory:  good    Sequencing:  good    Problem solving:  good    Judgement/safety:  good      Functional Assessment:   Initial Eval Status  Date: 9/6/22   Feeding Independent    Grooming Independent    UB Dressing Independent    LB Dressing Independent    Bathing Independent   Toileting Independent    Bed Mobility  Supine to sit: Independent   Sit to supine: Independent    Functional Transfers Sit to stand: Independent   Stand to sit: Independent   Stand pivot: Independent    Functional Mobility Indep with no AD   Balance Sitting:     Static:  wfl    Dynamic:wfl  Standing: wfl   Activity Tolerance wfl   Visual/  Perceptual Glasses: Yes  Appears WFL            Hand dominance: Right  UE ROM: RUE:  WFL  LUE:  WFL  Strength: RUE: grossly 5/5 LUE: grossly 5/5   Strength: B WFL  Fine Motor Coordination:  B WFL    Hearing: WFL  Sensation:  No c/o numbness or tingling  Tone:  WFL  Edema: None noted                            Comments/Treatment: RN approved session. Upon arrival, patient supine in bed & agreeable to OT session with significant other in room. Pt demonstrating independence with ADLs/mobility and good understanding of education/techniques. Pt completed functional mobility to<>from bathroom with no use of AD or c/o dizziness/lightheadedness. Pt sat EOB to engage in LB dressing tasks with min c/o R flank pain. At end of session, patient supine in bed with call light and phone within reach, all lines and tubes intact. Recommend d/c from OT d/t no acute skilled needs at this time. Evaluation time includes thorough review of current medical information, gathering information on past medical & social history & PLOF, completion of standardized testing, informal observation of tasks, consultation with other medical professions/disciplines, assessment of data & development of POC/goals.      Evaluation Complexity: Low    Time In: 9:13a  Time Out: 9:23a      Min Units   OT Eval Low 73786  x     OT Eval Medium 30289      OT Eval High [id number]       OT Re-Eval [id number]       Therapeutic Ex [id number]       Therapeutic Activities 99907       ADL/Self Care 11004       Orthotic Management 42080       Neuro Re-Ed 72638       Non-Billable Time           Vidal Worthy OTR/L; [id number]

## 2022-09-09 NOTE — PROGRESS NOTES
PCP is Alida Denver, MD  Office notified of admission.       Electronically signed by Aayush Schultz RN MSN APRN-NP [facility] NP  [facility] 9/9/2022 1:43 PM

## 2022-09-23 ENCOUNTER — OFFICE VISIT (OUTPATIENT)
Dept: SURGERY | Age: 57
End: 2022-09-23
Payer: COMMERCIAL

## 2022-09-23 DIAGNOSIS — S22.41XD CLOSED FRACTURE OF MULTIPLE RIBS OF RIGHT SIDE WITH ROUTINE HEALING, SUBSEQUENT ENCOUNTER: Primary | ICD-10-CM

## 2022-09-23 PROCEDURE — 99212 OFFICE O/P EST SF 10 MIN: CPT | Performed by: CLINICAL NURSE SPECIALIST

## 2022-09-23 PROCEDURE — 99213 OFFICE O/P EST LOW 20 MIN: CPT | Performed by: CLINICAL NURSE SPECIALIST

## 2022-09-23 RX ORDER — IBUPROFEN 200 MG
200 TABLET ORAL EVERY 6 HOURS PRN
COMMUNITY

## 2022-09-23 NOTE — PROGRESS NOTES
Trauma Clinic Progress Note   9/23/2022     Date of injury: 9/4         BRADY/Injuries:   Patient Active Problem List   Diagnosis Code    Pneumothorax, closed, traumatic, initial encounter S27. 0XXA    Closed fracture of multiple ribs of right side 3-4 S22.41XA    ETOH abuse F10.10       Surgeries:   No past surgical history on file. Vital signs: There were no vitals taken for this visit. Medications:    Prior to Admission medications    Medication Sig Start Date End Date Taking? Authorizing Provider   ibuprofen (ADVIL;MOTRIN) 200 MG tablet Take 200 mg by mouth every 6 hours as needed for Pain   Yes Historical Provider, MD          CC:  Trauma follow up    Patient presents to the clinic today for follow up of a fall in which he sustained rib fractures (3,4) and pneumothorax requiring a chest tube. He states he is feeling much better than when he left the hospital. He returned to work the day after discharge. He denies shortness of breath other than the cold he has at the present time. He states he does have intermittent pain from the fractures but usually only after over-exertion. He is taking robaxin and advil for pain control; using the robaxin only at night before bed to help him sleep. He denies any trouble with appetite or sleep. He states he was having some pain at the site of the chest tube, but that has resolved. He denies fever or chills. Headache denies  Dizziness. /Off balance endorses, only when standing too quickly, intermittently  Nausea/vomiting denies  Forgetful or poor memory denies  Poor concentration or in a fog denies  Vision changes:  Blurred denies. Double denies. Light sensitivity denies. Changes in sleep or more fatigued denies    Patient has seen his PCP in follow up. All progress notes have been reviewed. Physical Exam   Physical Exam  Constitutional:       Appearance: Normal appearance. HENT:      Head: Normocephalic.       Mouth/Throat:      Mouth: Mucous membranes are moist.   Eyes:      Extraocular Movements: Extraocular movements intact. Pupils: Pupils are equal, round, and reactive to light. Cardiovascular:      Rate and Rhythm: Normal rate and regular rhythm. Pulses: Normal pulses. Pulmonary:      Effort: Pulmonary effort is normal.      Breath sounds: Normal breath sounds. Comments: Healing chest tube site  Abdominal:      Palpations: Abdomen is soft. Musculoskeletal:         General: Normal range of motion. Cervical back: Normal range of motion. Skin:     General: Skin is warm and dry. Capillary Refill: Capillary refill takes less than 2 seconds. Neurological:      General: No focal deficit present. Mental Status: He is alert and oriented to person, place, and time. Psychiatric:         Mood and Affect: Mood normal.         Behavior: Behavior normal.         Thought Content: Thought content normal.         Judgment: Judgment normal.           Controlled substances monitoring: possible medication side effects, risk of tolerance and/or dependence, and alternative treatments discussed and OARRS report reviewed today- activity consistent with treatment plan. Education     Instructed to continue to use incentive spirometry 6-8 times per day. Instructed to increase activity. Instructed on concussion:  causes, definition, s&s, treatment, course of concussion. Instructed on opioid medications. Assessment  Patient Active Problem List   Diagnosis Code    Pneumothorax, closed, traumatic, initial encounter S27. 0XXA    Closed fracture of multiple ribs of right side 3-4 S22.41XA    ETOH abuse F10.10       Plan  RTC PRN  Follow up with PCP  Medications as ordered: ibuprofen, tylenol    Total time spent face-to-face with the patient, reviewing records and documentation was 20 minutes.      JENNIFER Tamez Aas - NP

## 2023-02-04 ENCOUNTER — APPOINTMENT (OUTPATIENT)
Dept: GENERAL RADIOLOGY | Age: 58
End: 2023-02-04

## 2023-02-04 ENCOUNTER — HOSPITAL ENCOUNTER (EMERGENCY)
Age: 58
Discharge: HOME | End: 2023-02-04
Attending: EMERGENCY MEDICINE

## 2023-02-04 VITALS
WEIGHT: 185 LBS | OXYGEN SATURATION: 94 % | RESPIRATION RATE: 16 BRPM | TEMPERATURE: 97.9 F | HEIGHT: 71 IN | HEART RATE: 101 BPM | SYSTOLIC BLOOD PRESSURE: 114 MMHG | DIASTOLIC BLOOD PRESSURE: 65 MMHG | BODY MASS INDEX: 25.9 KG/M2

## 2023-02-04 DIAGNOSIS — R55 SYNCOPE AND COLLAPSE: ICD-10-CM

## 2023-02-04 DIAGNOSIS — I95.1 ORTHOSTATIC SYNCOPE: Primary | ICD-10-CM

## 2023-02-04 LAB
ALBUMIN SERPL-MCNC: 3.8 G/DL (ref 3.5–5.2)
ALP BLD-CCNC: 37 U/L (ref 40–129)
ALT SERPL-CCNC: 37 U/L (ref 0–40)
ANION GAP SERPL CALCULATED.3IONS-SCNC: 18 MMOL/L (ref 7–16)
AST SERPL-CCNC: 35 U/L (ref 0–39)
BASOPHILS ABSOLUTE: 0.03 E9/L (ref 0–0.2)
BASOPHILS RELATIVE PERCENT: 0.4 % (ref 0–2)
BILIRUB SERPL-MCNC: <0.2 MG/DL (ref 0–1.2)
BUN BLDV-MCNC: 10 MG/DL (ref 6–20)
CALCIUM SERPL-MCNC: 8.1 MG/DL (ref 8.6–10.2)
CHLORIDE BLD-SCNC: 102 MMOL/L (ref 98–107)
CO2: 19 MMOL/L (ref 22–29)
CREAT SERPL-MCNC: 0.9 MG/DL (ref 0.7–1.2)
EKG ATRIAL RATE: 94 BPM
EKG P AXIS: 60 DEGREES
EKG P-R INTERVAL: 182 MS
EKG Q-T INTERVAL: 364 MS
EKG QRS DURATION: 88 MS
EKG QTC CALCULATION (BAZETT): 455 MS
EKG R AXIS: 21 DEGREES
EKG T AXIS: 42 DEGREES
EKG VENTRICULAR RATE: 94 BPM
EOSINOPHILS ABSOLUTE: 0.07 E9/L (ref 0.05–0.5)
EOSINOPHILS RELATIVE PERCENT: 1 % (ref 0–6)
GFR SERPL CREATININE-BSD FRML MDRD: >60 ML/MIN/1.73
GLUCOSE BLD-MCNC: 96 MG/DL (ref 74–99)
HCT VFR BLD CALC: 35.9 % (ref 37–54)
HEMOGLOBIN: 11.9 G/DL (ref 12.5–16.5)
IMMATURE GRANULOCYTES #: 0.03 E9/L
IMMATURE GRANULOCYTES %: 0.4 % (ref 0–5)
LYMPHOCYTES ABSOLUTE: 2.33 E9/L (ref 1.5–4)
LYMPHOCYTES RELATIVE PERCENT: 32.9 % (ref 20–42)
MCH RBC QN AUTO: 32.6 PG (ref 26–35)
MCHC RBC AUTO-ENTMCNC: 33.1 % (ref 32–34.5)
MCV RBC AUTO: 98.4 FL (ref 80–99.9)
MONOCYTES ABSOLUTE: 0.53 E9/L (ref 0.1–0.95)
MONOCYTES RELATIVE PERCENT: 7.5 % (ref 2–12)
NEUTROPHILS ABSOLUTE: 4.09 E9/L (ref 1.8–7.3)
NEUTROPHILS RELATIVE PERCENT: 57.8 % (ref 43–80)
PDW BLD-RTO: 12.9 FL (ref 11.5–15)
PLATELET # BLD: 174 E9/L (ref 130–450)
PMV BLD AUTO: 9.7 FL (ref 7–12)
POTASSIUM SERPL-SCNC: 3.9 MMOL/L (ref 3.5–5)
PRO-BNP: 13 PG/ML (ref 0–125)
RBC # BLD: 3.65 E12/L (ref 3.8–5.8)
SODIUM BLD-SCNC: 139 MMOL/L (ref 132–146)
TOTAL PROTEIN: 5.8 G/DL (ref 6.4–8.3)
TROPONIN, HIGH SENSITIVITY: 8 NG/L (ref 0–11)
TROPONIN, HIGH SENSITIVITY: <6 NG/L (ref 0–11)
WBC # BLD: 7.1 E9/L (ref 4.5–11.5)

## 2023-02-04 PROCEDURE — 71045 X-RAY EXAM CHEST 1 VIEW: CPT

## 2023-02-04 PROCEDURE — 85025 COMPLETE CBC W/AUTO DIFF WBC: CPT

## 2023-02-04 PROCEDURE — 80053 COMPREHEN METABOLIC PANEL: CPT

## 2023-02-04 PROCEDURE — 84484 ASSAY OF TROPONIN QUANT: CPT

## 2023-02-04 PROCEDURE — 93010 ELECTROCARDIOGRAM REPORT: CPT | Performed by: INTERNAL MEDICINE

## 2023-02-04 PROCEDURE — 83880 ASSAY OF NATRIURETIC PEPTIDE: CPT

## 2023-02-04 PROCEDURE — 2580000003 HC RX 258: Performed by: EMERGENCY MEDICINE

## 2023-02-04 PROCEDURE — 99285 EMERGENCY DEPT VISIT HI MDM: CPT

## 2023-02-04 PROCEDURE — 93005 ELECTROCARDIOGRAM TRACING: CPT | Performed by: EMERGENCY MEDICINE

## 2023-02-04 RX ORDER — LISINOPRIL 10 MG/1
TABLET ORAL
COMMUNITY
Start: 2022-12-28

## 2023-02-04 RX ORDER — 0.9 % SODIUM CHLORIDE 0.9 %
1000 INTRAVENOUS SOLUTION INTRAVENOUS ONCE
Status: COMPLETED | OUTPATIENT
Start: 2023-02-04 | End: 2023-02-04

## 2023-02-04 RX ADMIN — SODIUM CHLORIDE 1000 ML: 9 INJECTION, SOLUTION INTRAVENOUS at 03:29

## 2023-02-04 ASSESSMENT — PAIN - FUNCTIONAL ASSESSMENT: PAIN_FUNCTIONAL_ASSESSMENT: NONE - DENIES PAIN

## 2023-02-04 NOTE — ED PROVIDER NOTES
Latonya Beckwith is a 61 y.o. male    HPI   Latonya Beckwith is a 61 y.o. male presenting to the ED for Loss of Consciousness ((Washing dishes, felt hot, dizzy and collapsed, -N/V))    History comes primarily from the patient. Patient presents to the emergency department for syncopal episode. Patient was washing dishes on the counter when he simply felt lightheaded, dizzy, warm and collapsed. He did lose consciousness and woke up on the floor. He believes he was only unconscious for less than a minute. His wife was in the bedroom and did hear him collapsing was present when he came to. He had no postictal period. He denies hitting his head or having any injuries whatsoever. He denies any chest pain, shortness of breath, headache, abdominal pain, nausea, vomiting, diarrhea, urinary symptoms. Is never happened to him before. Review of Systems   Full review of systems completed. Pertinent positives and negatives per the HPI, unless otherwise stated ROS is negative. Physical Exam  Vitals reviewed. Constitutional:       General: He is not in acute distress. Appearance: Normal appearance. He is not ill-appearing. HENT:      Head: Normocephalic and atraumatic. Right Ear: External ear normal.      Left Ear: External ear normal.      Nose: Nose normal. No rhinorrhea. Mouth/Throat:      Mouth: Mucous membranes are moist.      Pharynx: Oropharynx is clear. Eyes:      Extraocular Movements: Extraocular movements intact. Conjunctiva/sclera: Conjunctivae normal.      Pupils: Pupils are equal, round, and reactive to light. Cardiovascular:      Rate and Rhythm: Normal rate and regular rhythm. Heart sounds: Normal heart sounds. No murmur heard. Pulmonary:      Effort: Pulmonary effort is normal. No respiratory distress. Breath sounds: Normal breath sounds. Abdominal:      General: Abdomen is flat. There is no distension. Tenderness: There is no abdominal tenderness. Musculoskeletal:         General: No swelling or tenderness. Normal range of motion. Cervical back: Normal range of motion. No rigidity or tenderness. Right lower leg: No edema. Left lower leg: No edema. Skin:     General: Skin is warm and dry. Findings: No rash. Neurological:      General: No focal deficit present. Mental Status: He is alert and oriented to person, place, and time. Cranial Nerves: No cranial nerve deficit. Motor: No weakness. Psychiatric:         Mood and Affect: Mood normal.         Behavior: Behavior normal.          Past medical history/chonic conditions affecting care   has a past medical history of Hypertension. Medical Decision Making/Differential Diagnosis:    CC/HPI Summary, social determinants of health, records reviewed, DDX, testing done/not done, ED course, reassessment, disposition considerations/shared decision making with patient, consults, disposition:    Medical Decision Making  Amount and/or Complexity of Data Reviewed  Labs: ordered. Radiology: ordered. ECG/medicine tests: ordered. Risk  Prescription drug management. EKG is ordered to have documentation of patient's current rhythm, and to rule out any obvious acute cardiac illnesses such as ACS. Additionally, QT interval may be of use in decision making regarding any medications administered here in the ED. CBC is ordered to evaluate for any signs of infection or inflammation by obtaining a WBC count, or any signs of acute anemia by interpreting hemoglobin. CMP was ordered to evaluate for any electrolyte imbalances, kidney function, or any elevations in anion gap. Troponin ordered to evaluate for possible cardiac etiology of symptoms including but not limited to STEMI or NSTEMI. BNP ordered to evaluate for possible cardiac failure or worsening cardiac function.  Chest x-ray is ordered to evaluate for any possible signs of pneumonia, pleural effusions, cardiomegaly, pneumothorax, atelectasis, rib or sternal abnormalities including fractures. Patient's labs are independently interpreted by me. Hemoglobin of 11.9. Serum CO2 19, calcium 8.1. Troponins are 8 and less than 6 with a negative delta. proBNP is normal.  S x-ray and EKG are normal and interpreted below. Patient was orthostatic positive and is treated with a saline bolus. Patient feeling much better and without any residual symptoms. He did not hit his head. Based on shared decision making, the patient would prefer to go home but will follow up with his primary care physician outpatient for further management or evaluation at this point. The patient can return to the emergency department anytime if his symptoms return. As interpreted by me, the below displayed labs are abnormal. All other labs obtained during this visit were within normal range or not returned as of this dictation. Labs Reviewed   CBC WITH AUTO DIFFERENTIAL - Abnormal; Notable for the following components:       Result Value    RBC 3.65 (*)     Hemoglobin 11.9 (*)     Hematocrit 35.9 (*)     All other components within normal limits   COMPREHENSIVE METABOLIC PANEL - Abnormal; Notable for the following components:    CO2 19 (*)     Anion Gap 18 (*)     Calcium 8.1 (*)     Total Protein 5.8 (*)     Alkaline Phosphatase 37 (*)     All other components within normal limits   TROPONIN   BRAIN NATRIURETIC PEPTIDE   TROPONIN       EKG interpretation: This EKG is signed and interpreted by me. Rate: 94  Rhythm: Sinus  Axis: normal  Interpretation: No ST elevations, QRS is narrow, QTC is 455.   Comparison: no previous EKG available    RADIOLOGY:   Non-plain film images such as CT, Ultrasound and MRI are read by the radiologist. Plain radiographic images are visualized and preliminarily interpreted by myself with the below findings:  Chest x-ray shows no focal consolidations, pneumothorax, bony abnormalities    Interpretation per the Radiologist below, if available at the time of this note:  XR CHEST PORTABLE   Final Result   No acute process. No results found. No results found. Procedures       Critical care time      Emergency Department Course  Vitals:    Vitals:    02/04/23 0124 02/04/23 0126 02/04/23 0131 02/04/23 0500   BP:   113/77 114/65   Pulse:  96  (!) 101   Resp:  24  16   Temp: 97.9 °F (36.6 °C)      SpO2:   97% 94%   Weight:  185 lb (83.9 kg)     Height:  5' 11\" (1.803 m)         Patient was given the following medications:  Medications   0.9 % sodium chloride bolus (0 mLs IntraVENous Stopped 2/4/23 0512)       ED Course as of 02/05/23 0100   Sat Feb 04, 2023 0508 Spoke with patient and based on his laboratory findings and utilizing shared decision making, the patient would like to be discharged home to follow-up with his primary care physician. [KS]      ED Course User Index  [KS] Esperanza Hector MD          CONSULTS: (Who and What was discussed)  None        I am the Primary Clinician of Record. Final impression  1. Orthostatic syncope    2.  Syncope and collapse          Disposition/plan  DISPOSITION Decision To Discharge 02/04/2023 05:07:59 AM    PATIENT REFERRED TO:  Ruth Ferguson, [address] [address]  [phone]    Call in 3 days      DISCHARGE MEDICATIONS:  Discharge Medication List as of 2/4/2023  5:13 AM          DISCONTINUED MEDICATIONS:  Discharge Medication List as of 2/4/2023  5:13 AM                 (Please note that portions of this note were completed with a voice recognition program.  Efforts were made to edit the dictations but occasionally words are mis-transcribed.)         Esperanza Hector MD  Resident  02/05/23 0100